# Patient Record
Sex: FEMALE | Race: BLACK OR AFRICAN AMERICAN | Employment: UNEMPLOYED | URBAN - METROPOLITAN AREA
[De-identification: names, ages, dates, MRNs, and addresses within clinical notes are randomized per-mention and may not be internally consistent; named-entity substitution may affect disease eponyms.]

---

## 2017-04-07 ENCOUNTER — HOSPITAL ENCOUNTER (OUTPATIENT)
Dept: MAMMOGRAPHY | Age: 52
Discharge: HOME | End: 2017-04-07
Attending: FAMILY MEDICINE
Payer: MEDICARE

## 2017-04-07 DIAGNOSIS — M89.9 BONE DISORDER: ICD-10-CM

## 2017-04-07 PROCEDURE — 77080 DXA BONE DENSITY AXIAL: CPT

## 2017-05-09 ENCOUNTER — ANESTHESIA EVENT (OUTPATIENT)
Dept: SURGERY | Age: 52
End: 2017-05-09
Payer: MEDICARE

## 2017-05-10 ENCOUNTER — ANESTHESIA (OUTPATIENT)
Dept: SURGERY | Age: 52
End: 2017-05-10
Payer: MEDICARE

## 2017-05-10 ENCOUNTER — HOSPITAL ENCOUNTER (OUTPATIENT)
Age: 52
Setting detail: OUTPATIENT SURGERY
Discharge: HOME | End: 2017-05-10
Attending: ORTHOPAEDIC SURGERY | Admitting: ORTHOPAEDIC SURGERY
Payer: MEDICARE

## 2017-05-10 VITALS
BODY MASS INDEX: 44.14 KG/M2 | SYSTOLIC BLOOD PRESSURE: 163 MMHG | WEIGHT: 257.13 LBS | RESPIRATION RATE: 15 BRPM | DIASTOLIC BLOOD PRESSURE: 111 MMHG | HEART RATE: 87 BPM | OXYGEN SATURATION: 97 % | TEMPERATURE: 98.7 F

## 2017-05-10 PROCEDURE — 74011250636 HC RX REV CODE- 250/636

## 2017-05-10 PROCEDURE — 76210000020 HC REC RM PH II FIRST 0.5 HR: Performed by: ORTHOPAEDIC SURGERY

## 2017-05-10 PROCEDURE — 77030010430: Performed by: ORTHOPAEDIC SURGERY

## 2017-05-10 PROCEDURE — 77030020143 HC AIRWY LARYN INTUB CGAS -A: Performed by: ANESTHESIOLOGY

## 2017-05-10 PROCEDURE — 76942 ECHO GUIDE FOR BIOPSY: CPT | Performed by: ORTHOPAEDIC SURGERY

## 2017-05-10 PROCEDURE — 77030006668 HC BLD SHV MENSCS STRY -B: Performed by: ORTHOPAEDIC SURGERY

## 2017-05-10 PROCEDURE — 74011000250 HC RX REV CODE- 250

## 2017-05-10 PROCEDURE — 77030018673: Performed by: ORTHOPAEDIC SURGERY

## 2017-05-10 PROCEDURE — 76010000160 HC OR TIME 0.5 TO 1 HR INTENSV-TIER 1: Performed by: ORTHOPAEDIC SURGERY

## 2017-05-10 PROCEDURE — C1713 ANCHOR/SCREW BN/BN,TIS/BN: HCPCS | Performed by: ORTHOPAEDIC SURGERY

## 2017-05-10 PROCEDURE — 74011250637 HC RX REV CODE- 250/637: Performed by: ANESTHESIOLOGY

## 2017-05-10 PROCEDURE — 76060000033 HC ANESTHESIA 1 TO 1.5 HR: Performed by: ORTHOPAEDIC SURGERY

## 2017-05-10 PROCEDURE — 77030019605: Performed by: ORTHOPAEDIC SURGERY

## 2017-05-10 PROCEDURE — 77030018836 HC SOL IRR NACL ICUM -A: Performed by: ORTHOPAEDIC SURGERY

## 2017-05-10 PROCEDURE — 74011250636 HC RX REV CODE- 250/636: Performed by: ORTHOPAEDIC SURGERY

## 2017-05-10 PROCEDURE — 76010010054 HC POST OP PAIN BLOCK: Performed by: ORTHOPAEDIC SURGERY

## 2017-05-10 PROCEDURE — 77030002933 HC SUT MCRYL J&J -A: Performed by: ORTHOPAEDIC SURGERY

## 2017-05-10 PROCEDURE — 74011250636 HC RX REV CODE- 250/636: Performed by: ANESTHESIOLOGY

## 2017-05-10 PROCEDURE — 77030033005 HC TBNG ARTHSC PMP STRY -B: Performed by: ORTHOPAEDIC SURGERY

## 2017-05-10 PROCEDURE — 77030027384 HC PRB ARTHSCP SERFAS STRY -C: Performed by: ORTHOPAEDIC SURGERY

## 2017-05-10 PROCEDURE — 76210000063 HC OR PH I REC FIRST 0.5 HR: Performed by: ORTHOPAEDIC SURGERY

## 2017-05-10 PROCEDURE — 77030003666 HC NDL SPINAL BD -A: Performed by: ORTHOPAEDIC SURGERY

## 2017-05-10 PROCEDURE — 77030003602 HC NDL NRV BLK BBMI -B: Performed by: ANESTHESIOLOGY

## 2017-05-10 PROCEDURE — 77030004453 HC BUR SHV STRY -B: Performed by: ORTHOPAEDIC SURGERY

## 2017-05-10 PROCEDURE — 77030033073 HC TBNG ARTHSC PMP OUTFLO STRY -B: Performed by: ORTHOPAEDIC SURGERY

## 2017-05-10 PROCEDURE — 77030032490 HC SLV COMPR SCD KNE COVD -B: Performed by: ORTHOPAEDIC SURGERY

## 2017-05-10 PROCEDURE — 77030034139 HC NDL ENDOSC TRUPASS SGL S&N -C: Performed by: ORTHOPAEDIC SURGERY

## 2017-05-10 DEVICE — MULTIFIX S-ULTRA 5.5MM KNOTLESS ANCHOR
Type: IMPLANTABLE DEVICE | Site: SHOULDER | Status: FUNCTIONAL
Brand: MULTIFIX

## 2017-05-10 RX ORDER — DEXAMETHASONE SODIUM PHOSPHATE 4 MG/ML
INJECTION, SOLUTION INTRA-ARTICULAR; INTRALESIONAL; INTRAMUSCULAR; INTRAVENOUS; SOFT TISSUE AS NEEDED
Status: DISCONTINUED | OUTPATIENT
Start: 2017-05-10 | End: 2017-05-10 | Stop reason: HOSPADM

## 2017-05-10 RX ORDER — PROPOFOL 10 MG/ML
INJECTION, EMULSION INTRAVENOUS AS NEEDED
Status: DISCONTINUED | OUTPATIENT
Start: 2017-05-10 | End: 2017-05-10 | Stop reason: HOSPADM

## 2017-05-10 RX ORDER — OXYCODONE AND ACETAMINOPHEN 5; 325 MG/1; MG/1
1 TABLET ORAL AS NEEDED
Status: DISCONTINUED | OUTPATIENT
Start: 2017-05-10 | End: 2017-05-10 | Stop reason: HOSPADM

## 2017-05-10 RX ORDER — SODIUM CHLORIDE, SODIUM LACTATE, POTASSIUM CHLORIDE, CALCIUM CHLORIDE 600; 310; 30; 20 MG/100ML; MG/100ML; MG/100ML; MG/100ML
75 INJECTION, SOLUTION INTRAVENOUS CONTINUOUS
Status: DISCONTINUED | OUTPATIENT
Start: 2017-05-10 | End: 2017-05-10 | Stop reason: HOSPADM

## 2017-05-10 RX ORDER — HYDROMORPHONE HYDROCHLORIDE 2 MG/ML
0.5 INJECTION, SOLUTION INTRAMUSCULAR; INTRAVENOUS; SUBCUTANEOUS
Status: DISCONTINUED | OUTPATIENT
Start: 2017-05-10 | End: 2017-05-10 | Stop reason: HOSPADM

## 2017-05-10 RX ORDER — LIDOCAINE HYDROCHLORIDE 10 MG/ML
0.1 INJECTION INFILTRATION; PERINEURAL AS NEEDED
Status: DISCONTINUED | OUTPATIENT
Start: 2017-05-10 | End: 2017-05-10 | Stop reason: HOSPADM

## 2017-05-10 RX ORDER — CEFAZOLIN SODIUM IN 0.9 % NACL 2 G/50 ML
2 INTRAVENOUS SOLUTION, PIGGYBACK (ML) INTRAVENOUS ONCE
Status: COMPLETED | OUTPATIENT
Start: 2017-05-10 | End: 2017-05-10

## 2017-05-10 RX ORDER — NALOXONE HYDROCHLORIDE 0.4 MG/ML
0.2 INJECTION, SOLUTION INTRAMUSCULAR; INTRAVENOUS; SUBCUTANEOUS AS NEEDED
Status: DISCONTINUED | OUTPATIENT
Start: 2017-05-10 | End: 2017-05-10 | Stop reason: HOSPADM

## 2017-05-10 RX ORDER — MIDAZOLAM HYDROCHLORIDE 1 MG/ML
2 INJECTION, SOLUTION INTRAMUSCULAR; INTRAVENOUS
Status: DISCONTINUED | OUTPATIENT
Start: 2017-05-10 | End: 2017-05-10 | Stop reason: HOSPADM

## 2017-05-10 RX ORDER — FAMOTIDINE 20 MG/1
20 TABLET, FILM COATED ORAL ONCE
Status: COMPLETED | OUTPATIENT
Start: 2017-05-10 | End: 2017-05-10

## 2017-05-10 RX ORDER — SODIUM CHLORIDE 0.9 % (FLUSH) 0.9 %
5-10 SYRINGE (ML) INJECTION AS NEEDED
Status: DISCONTINUED | OUTPATIENT
Start: 2017-05-10 | End: 2017-05-10 | Stop reason: HOSPADM

## 2017-05-10 RX ORDER — ONDANSETRON 2 MG/ML
INJECTION INTRAMUSCULAR; INTRAVENOUS AS NEEDED
Status: DISCONTINUED | OUTPATIENT
Start: 2017-05-10 | End: 2017-05-10 | Stop reason: HOSPADM

## 2017-05-10 RX ORDER — LIDOCAINE HYDROCHLORIDE 20 MG/ML
INJECTION, SOLUTION EPIDURAL; INFILTRATION; INTRACAUDAL; PERINEURAL AS NEEDED
Status: DISCONTINUED | OUTPATIENT
Start: 2017-05-10 | End: 2017-05-10 | Stop reason: HOSPADM

## 2017-05-10 RX ORDER — EPINEPHRINE 1 MG/ML
INJECTION, SOLUTION, CONCENTRATE INTRAVENOUS AS NEEDED
Status: DISCONTINUED | OUTPATIENT
Start: 2017-05-10 | End: 2017-05-10 | Stop reason: HOSPADM

## 2017-05-10 RX ORDER — FENTANYL CITRATE 50 UG/ML
100 INJECTION, SOLUTION INTRAMUSCULAR; INTRAVENOUS ONCE
Status: COMPLETED | OUTPATIENT
Start: 2017-05-10 | End: 2017-05-10

## 2017-05-10 RX ADMIN — HYDROMORPHONE HYDROCHLORIDE 0.5 MG: 2 INJECTION, SOLUTION INTRAMUSCULAR; INTRAVENOUS; SUBCUTANEOUS at 11:05

## 2017-05-10 RX ADMIN — FENTANYL CITRATE 100 MCG: 50 INJECTION, SOLUTION INTRAMUSCULAR; INTRAVENOUS at 09:27

## 2017-05-10 RX ADMIN — DEXAMETHASONE SODIUM PHOSPHATE 4 MG: 4 INJECTION, SOLUTION INTRA-ARTICULAR; INTRALESIONAL; INTRAMUSCULAR; INTRAVENOUS; SOFT TISSUE at 10:03

## 2017-05-10 RX ADMIN — MIDAZOLAM HYDROCHLORIDE 2 MG: 1 INJECTION, SOLUTION INTRAMUSCULAR; INTRAVENOUS at 09:27

## 2017-05-10 RX ADMIN — FAMOTIDINE 20 MG: 20 TABLET ORAL at 09:15

## 2017-05-10 RX ADMIN — LIDOCAINE HYDROCHLORIDE 60 MG: 20 INJECTION, SOLUTION EPIDURAL; INFILTRATION; INTRACAUDAL; PERINEURAL at 09:54

## 2017-05-10 RX ADMIN — PROPOFOL 200 MG: 10 INJECTION, EMULSION INTRAVENOUS at 09:54

## 2017-05-10 RX ADMIN — SODIUM CHLORIDE, SODIUM LACTATE, POTASSIUM CHLORIDE, AND CALCIUM CHLORIDE 75 ML/HR: 600; 310; 30; 20 INJECTION, SOLUTION INTRAVENOUS at 09:10

## 2017-05-10 RX ADMIN — CEFAZOLIN 2 G: 1 INJECTION, POWDER, FOR SOLUTION INTRAMUSCULAR; INTRAVENOUS; PARENTERAL at 09:51

## 2017-05-10 RX ADMIN — ONDANSETRON 4 MG: 2 INJECTION INTRAMUSCULAR; INTRAVENOUS at 10:03

## 2017-05-10 NOTE — IP AVS SNAPSHOT
Jonathan Mendoza 
 
 
 [facility] 
[phone] Patient: Cathy Lomeli MRN: [id number] Nikki Pain You are allergic to the following Allergen Reactions Aspralum (Aspirin, Buffered) Palpitations Pt reports heart palpatations Recent Documentation Weight BMI OB Status Smoking Status 116.6 kg 44.14 kg/m2 Hysterectomy Never Smoker Emergency Contacts Name Discharge Info Relation Home Work Mobile Renetta Shen CAREGIVER [3] Daughter [21] [phone] About your hospitalization You were admitted on:  May 10, 2017 You last received care in the:  [place] [address] You were discharged on:  May 10, 2017 Unit phone number:  [phone] Why you were hospitalized Your primary diagnosis was:  Not on File Providers Seen During Your Hospitalizations Provider Role Specialty Primary office phone James Al MD Attending Provider Orthopedic Surgery [phone] Your Primary Care Physician (PCP) Primary Care Physician Office Phone Office Fax [address] [facility] [place], [address] [facility] [phone] Follow-up Information None Your Appointments Thursday May 11, 2017 11:30 AM EDT New Patient with Garrett Kiser MD  
[facility] ENDOCRINOLOGY) [address] [place] [address] [address] [phone] Current Discharge Medication List  
  
ASK your doctor about these medications Dose & Instructions Dispensing Information Comments Morning Noon Evening Bedtime buPROPion  mg SR tablet Commonly known as:  Jv Holding Your last dose was: Your next dose is:    
   
   
 Dose:  100 mg Take 100 mg by mouth nightly. Refills:  0  
     
   
   
   
  
 lisinopril 40 mg tablet Commonly known as:  Michael Leys Your last dose was: Your next dose is:    
   
   
 Dose:  40 mg Take 40 mg by mouth nightly. Refills:  0  
     
   
   
   
  
 omeprazole 40 mg capsule Commonly known as:  PRILOSEC Your last dose was: Your next dose is:    
   
   
 Dose:  40 mg Take 40 mg by mouth daily. Refills:  0  
     
   
   
   
  
 pravastatin 20 mg tablet Commonly known as:  PRAVACHOL Your last dose was: Your next dose is:    
   
   
 Dose:  20 mg Take 20 mg by mouth nightly. Refills:  0 Discharge Instructions Post-Operative Instructions For Shoulder Arthroscopy Rotator Cuff Repair Phone:  [phone] 1. If you do not have an \"Iceman\" type cooling unit, for the first 48-72 hours following surgery, use ice on the shoulder every two hours (while awake) for 20-30 minutes at a time to help prevent swelling and lessen pain. If you have a cooling unit, follow the instructions given to you- continually as much as possible the first 48-72 hours, then 3-4 times a day for 4 weeks. 2. You may shower after the arthroscopy. Keep dressings in place for the first three days. After three days, you may remove the dressings and leave the steri-strip bandages in place; they will peel off naturally. 3. Stay in sling at all times except to shower. 4. Begin therapy as ordered. 5. Use any pain medication as instructed. You should take your pain medication as soon as you feel the anesthetic wearing off. Do not wait until you are in severe pain to begin taking your pain medication. 6. You may have some side effects from your pain medication. If you have nausea, try taking your medication with food. For itching, you may take over the counter Benadryl. 7. You may have been given a prescription for Zofran or Phenergan.   This medication is used for nausea and vomiting. You do not need to get this prescription filled unless you have a problem. 8. If you have a problem, please call [address] [place] [address] at [phone] [employer] ACTIVITY · As tolerated and as directed by your doctor. · Bathe or shower as directed by your doctor. DIET · Clear liquids until no nausea or vomiting; then light diet for the first day. · Advance to regular diet on second day, unless your doctor orders otherwise. · If nausea and vomiting continues, call your doctor. PAIN 
· Take pain medication as directed by your doctor. · Call your doctor if pain is NOT relieved by medication. · DO NOT take aspirin of blood thinners unless directed by your doctor. DRESSING CARE  
 
 
CALL YOUR DOCTOR IF  
· Excessive bleeding that does not stop after holding pressure over the area · Temperature of 101 degrees F or above · Excessive redness, swelling or bruising, and/ or green or yellow, smelly discharge from incision AFTER ANESTHESIA · For the first 24 hours: DO NOT Drive, Drink alcoholic beverages, or Make important decisions. · Be aware of dizziness following anesthesia and while taking pain medication. APPOINTMENT DATE/ TIME 
 
YOUR DOCTOR'S PHONE NUMBER  
 
 
DISCHARGE SUMMARY from Nurse PATIENT INSTRUCTIONS: 
 
After general anesthesia or intravenous sedation, for 24 hours or while taking prescription Narcotics: · Limit your activities · Do not drive and operate hazardous machinery · Do not make important personal or business decisions · Do  not drink alcoholic beverages · If you have not urinated within 8 hours after discharge, please contact your surgeon on call. *  Please give a list of your current medications to your Primary Care Provider.  
 
*  Please update this list whenever your medications are discontinued, doses are 
 changed, or new medications (including over-the-counter products) are added. *  Please carry medication information at all times in case of emergency situations. These are general instructions for a healthy lifestyle: No smoking/ No tobacco products/ Avoid exposure to second hand smoke Surgeon General's Warning:  Quitting smoking now greatly reduces serious risk to your health. Obesity, smoking, and sedentary lifestyle greatly increases your risk for illness A healthy diet, regular physical exercise & weight monitoring are important for maintaining a healthy lifestyle You may be retaining fluid if you have a history of heart failure or if you experience any of the following symptoms:  Weight gain of 3 pounds or more overnight or 5 pounds in a week, increased swelling in our hands or feet or shortness of breath while lying flat in bed. Please call your doctor as soon as you notice any of these symptoms; do not wait until your next office visit. Recognize signs and symptoms of STROKE: 
 
F-face looks uneven A-arms unable to move or move unevenly S-speech slurred or non-existent T-time-call 911 as soon as signs and symptoms begin-DO NOT go Back to bed or wait to see if you get better-TIME IS BRAIN. Discharge Orders None Introducing [facility] HE[facility]! Mario Mckeon introduces Sellywhere patient portal. Now you can access parts of your medical record, email your doctor's office, and request medication refills online. 1. In your internet browser, go to [email or web address] 2. Click on the First Time User? Click Here link in the Sign In box. You will see the New Member Sign Up page. 3. Enter your Sellywhere Access Code exactly as it appears below. You will not need to use this code after youve completed the sign-up process. If you do not sign up before the expiration date, you must request a new code.  
 
· Sellywhere Access Code: [email or web address] 
 Expires: 6/13/2017  4:41 PM 
 
4. Enter the last four digits of your Social Security Number (xxxx) and Date of Birth (mm/dd/yyyy) as indicated and click Submit. You will be taken to the next sign-up page. 5. Create a Spoonfed ID. This will be your Spoonfed login ID and cannot be changed, so think of one that is secure and easy to remember. 6. Create a Spoonfed password. You can change your password at any time. 7. Enter your Password Reset Question and Answer. This can be used at a later time if you forget your password. 8. Enter your e-mail address. You will receive e-mail notification when new information is available in [address] 9. Click Sign Up. You can now view and download portions of your medical record. 10. Click the Download Summary menu link to download a portable copy of your medical information. If you have questions, please visit the Frequently Asked Questions section of the Spoonfed website. Remember, Spoonfed is NOT to be used for urgent needs. For medical emergencies, dial 911. Now available from your iPhone and Android! General Information Please provide this summary of care documentation to your next provider. Patient Signature:  ____________________________________________________________ Date:  ____________________________________________________________  
  
Alen Flores Provider Signature:  ____________________________________________________________ Date:  ____________________________________________________________

## 2017-05-10 NOTE — ANESTHESIA PREPROCEDURE EVALUATION
Anesthetic History   No history of anesthetic complications            Review of Systems / Medical History  Patient summary reviewed, nursing notes reviewed and pertinent labs reviewed    Pulmonary        Sleep apnea: No treatment           Neuro/Psych              Cardiovascular    Hypertension: well controlled          Hyperlipidemia    Exercise tolerance: >4 METS     GI/Hepatic/Renal     GERD: well controlled           Endo/Other  Within defined limits           Other Findings              Physical Exam    Airway  Mallampati: III  TM Distance: 4 - 6 cm  Neck ROM: normal range of motion   Mouth opening: Normal     Cardiovascular    Rhythm: regular           Dental  No notable dental hx       Pulmonary  Breath sounds clear to auscultation               Abdominal         Other Findings            Anesthetic Plan    ASA: 2  Anesthesia type: general      Post-op pain plan if not by surgeon: peripheral nerve block single    Induction: Intravenous  Anesthetic plan and risks discussed with: Patient

## 2017-05-10 NOTE — ANESTHESIA POSTPROCEDURE EVALUATION
Post-Anesthesia Evaluation and Assessment    Patient: Sofie Ricks MRN: [id number]  SSN: [id number]    YOB: 1965  Age: 49 y.o. Sex: female       Cardiovascular Function/Vital Signs  Visit Vitals    BP (!) 163/111    Pulse 87    Temp 37.1 °C (98.7 °F)    Resp 15    Wt 116.6 kg (257 lb 2 oz)    SpO2 97%    BMI 44.14 kg/m2       Patient is status post general anesthesia for Procedure(s):  SHOULDER ARTHROSCOPY ROTATOR CUFF REPAIR/ LEFT DECOMPRESSION, DISTAL CLAVICAL EXCISION. Nausea/Vomiting: None    Postoperative hydration reviewed and adequate. Pain:  Pain Scale 1: Numeric (0 - 10) (05/10/17 1112)  Pain Intensity 1: 0 (05/10/17 1112)   Managed    Neurological Status:   Neuro (WDL): Within Defined Limits (05/10/17 1112)  Neuro  Neurologic State: Alert (05/10/17 1112)  LUE Motor Response: Numbness; Pharmacologically paralyzed (05/10/17 1112)  LLE Motor Response: Purposeful (05/10/17 1112)  RUE Motor Response: Purposeful (05/10/17 1112)  RLE Motor Response: Purposeful (05/10/17 1112)   At baseline    Mental Status and Level of Consciousness: Arousable    Pulmonary Status:   O2 Device: Room air (05/10/17 1121)   Adequate oxygenation and airway patent    Complications related to anesthesia: None    Post-anesthesia assessment completed.  No concerns    Signed By: Ethel Suggs MD     May 10, 2017

## 2017-05-10 NOTE — ANESTHESIA PROCEDURE NOTES
Peripheral Block    Start time: 5/10/2017 9:27 AM  End time: 5/10/2017 9:31 AM  Performed by: Seth Pedroza  Authorized by: Seth Pedroza       Pre-procedure: Indications: at surgeon's request and post-op pain management    Preanesthetic Checklist: patient identified, risks and benefits discussed, site marked, timeout performed, anesthesia consent given and patient being monitored    Timeout Time: 09:27          Block Type:   Block Type:   Interscalene  Laterality:  Left  Monitoring:  Standard ASA monitoring, responsive to questions, oxygen, continuous pulse ox, frequent vital sign checks and heart rate  Injection Technique:  Single shot  Procedures: ultrasound guided    Patient Position: seated  Prep: chlorhexidine    Location:  Interscalene  Needle Type:  Stimuplex  Needle Gauge:  22 G  Needle Localization:  Ultrasound guidance  Medication Injected:  0.375%  bupivacaine  Adds:  Epi 1:400K  Volume (mL):  25  mepivacaine  Volume (mL):  10    Assessment:  Number of attempts:  1  Injection Assessment:  Incremental injection every 5 mL, negative aspiration for CSF, no paresthesia, ultrasound image on chart, no intravascular symptoms, negative aspiration for blood and local visualized surrounding nerve on ultrasound  Patient tolerance:  Patient tolerated the procedure well with no immediate complications

## 2017-05-10 NOTE — DISCHARGE INSTRUCTIONS
Post-Operative Instructions   For  Shoulder Arthroscopy Rotator Cuff Repair  Phone:  [phone]    1. If you do not have an \"Iceman\" type cooling unit, for the first 48-72 hours following surgery, use ice on the shoulder every two hours (while awake) for 20-30 minutes at a time to help prevent swelling and lessen pain. If you have a cooling unit, follow the instructions given to you- continually as much as possible the first 48-72 hours, then 3-4 times a day for 4 weeks. 2. You may shower after the arthroscopy. Keep dressings in place for the first three days. After three days, you may remove the dressings and leave the steri-strip bandages in place; they will peel off naturally. 3. Stay in sling at all times except to shower. 4. Begin therapy as ordered. 5. Use any pain medication as instructed. You should take your pain medication as soon as you feel the anesthetic wearing off. Do not wait until you are in severe pain to begin taking your pain medication. 6. You may have some side effects from your pain medication. If you have nausea, try taking your medication with food. For itching, you may take over the counter Benadryl. 7. You may have been given a prescription for Zofran or Phenergan. This medication is used for nausea and vomiting. You do not need to get this prescription filled unless you have a problem. 8. If you have a problem, please call [address] [place] [address] at [phone]    [address] [place] [address], P.A[facility] ACTIVITY  · As tolerated and as directed by your doctor. · Bathe or shower as directed by your doctor. DIET  · Clear liquids until no nausea or vomiting; then light diet for the first day. · Advance to regular diet on second day, unless your doctor orders otherwise. · If nausea and vomiting continues, call your doctor. PAIN  · Take pain medication as directed by your doctor.    · Call your doctor if pain is NOT relieved by medication. · DO NOT take aspirin of blood thinners unless directed by your doctor. DRESSING CARE       CALL YOUR DOCTOR IF   · Excessive bleeding that does not stop after holding pressure over the area  · Temperature of 101 degrees F or above  · Excessive redness, swelling or bruising, and/ or green or yellow, smelly discharge from incision    AFTER ANESTHESIA   · For the first 24 hours: DO NOT Drive, Drink alcoholic beverages, or Make important decisions. · Be aware of dizziness following anesthesia and while taking pain medication. APPOINTMENT DATE/ TIME    YOUR DOCTOR'S PHONE NUMBER       DISCHARGE SUMMARY from Nurse    PATIENT INSTRUCTIONS:    After general anesthesia or intravenous sedation, for 24 hours or while taking prescription Narcotics:  · Limit your activities  · Do not drive and operate hazardous machinery  · Do not make important personal or business decisions  · Do  not drink alcoholic beverages  · If you have not urinated within 8 hours after discharge, please contact your surgeon on call. *  Please give a list of your current medications to your Primary Care Provider. *  Please update this list whenever your medications are discontinued, doses are      changed, or new medications (including over-the-counter products) are added. *  Please carry medication information at all times in case of emergency situations. These are general instructions for a healthy lifestyle:    No smoking/ No tobacco products/ Avoid exposure to second hand smoke    Surgeon General's Warning:  Quitting smoking now greatly reduces serious risk to your health.     Obesity, smoking, and sedentary lifestyle greatly increases your risk for illness    A healthy diet, regular physical exercise & weight monitoring are important for maintaining a healthy lifestyle    You may be retaining fluid if you have a history of heart failure or if you experience any of the following symptoms:  Weight gain of 3 pounds or more overnight or 5 pounds in a week, increased swelling in our hands or feet or shortness of breath while lying flat in bed. Please call your doctor as soon as you notice any of these symptoms; do not wait until your next office visit. Recognize signs and symptoms of STROKE:    F-face looks uneven    A-arms unable to move or move unevenly    S-speech slurred or non-existent    T-time-call 911 as soon as signs and symptoms begin-DO NOT go       Back to bed or wait to see if you get better-TIME IS BRAIN.

## 2017-05-10 NOTE — H&P
Outpatient Surgery History and Physical:  Robinson Levin was seen and examined. CHIEF COMPLAINT:   Left shoulder pain. PE:     Visit Vitals    BP (!) 140/93 (BP 1 Location: Right arm, BP Patient Position: Supine)    Pulse 85    Temp 98.7 °F (37.1 °C)    Resp 18    Wt 116.6 kg (257 lb 2 oz)    SpO2 99%    BMI 44.14 kg/m2       Heart:   Regular rhythm      Lungs:  Are clear      Past Medical History: There are no active problems to display for this patient. Surgical History:   Past Surgical History:   Procedure Laterality Date    HX GYN      HX HYSTERECTOMY      HX TUMOR REMOVAL      Brain        Social History: Patient  reports that she has never smoked. She does not have any smokeless tobacco history on file. She reports that she does not drink alcohol. Family History:   Family History   Problem Relation Age of Onset    Liver Disease Mother     Cancer Father        Allergies: Reviewed per EMR  Allergies   Allergen Reactions    Aspralum [Aspirin, Buffered] Palpitations     Pt reports heart palpatations       Medications:    No current facility-administered medications on file prior to encounter. Current Outpatient Prescriptions on File Prior to Encounter   Medication Sig    lisinopril (PRINIVIL, ZESTRIL) 40 mg tablet Take 40 mg by mouth nightly.  omeprazole (PRILOSEC) 40 mg capsule Take 40 mg by mouth daily.  pravastatin (PRAVACHOL) 20 mg tablet Take 20 mg by mouth nightly.  buPROPion SR (WELLBUTRIN SR) 100 mg SR tablet Take 100 mg by mouth nightly. The surgery is planned for the left shoulder. History and physical has been reviewed. The patient has been examined. There have been no significant clinical changes since the completion of the originally dated History and Physical.  Patient identified by surgeon; surgical site was confirmed by patient and surgeon. The patient is here today for outpatient surgery.  I have examined the patient, no changes are noted in the patient's medical status. Necessity for the procedure/care is still present and the history and physical above is current. See the office notes for the full long term history of the problem. Please see the recent office notes for the musculoskeletal examination.     Signed By: ETELVINA Calixto     May 10, 2017 8:44 AM

## 2017-05-11 NOTE — OP NOTES
[facility]   OPERATIVE REPORT       Name:  Teresita Niño   MR#:  [id number]   :  1965   Account #:  [de-identified]   Date of Adm:  05/10/2017       DATE OF SURGERY: 05/10/2017     PREPROCEDURE DIAGNOSES   1. Rotator cuff tear. 2. Acromioclavicular arthritis. 3. Impingement. 4. Labral tear, left shoulder. POSTOPERATIVE DIAGNOSES   1. Rotator cuff tear. 2. Acromioclavicular arthritis. 3. Impingement. 4. Labral tear, left shoulder. OPERATION PERFORMED     1. Arthroscopic rotator cuff repair. 2. Arthroscopic distal clavicle excision (Christiano procedure). 3. Debridement of labral tear. 4. Arthroscopic subacromial decompression, left shoulder. SURGEON: Teja Mayers MD.    ASSISTANT: ETELVINA Michel.     ANESTHESIA: General.    FLUIDS: Crystalloid. ESTIMATED BLOOD LOSS: Minimal.    FINDINGS: There was a high-grade partial tear encompassing   approximately 90% of the thickness of the supraspinatus. The   biceps was intact. There was some anterior and superior labral   tearing. There was no glenohumeral arthritis. There was an   anterior/inferior acromial slope. There was undersurface   osteophyte formation and degenerative change of the distal   clavicle at the [facility] joint. DESCRIPTION OF PROCEDURE: After informed consent, the patient   was brought to the operating room, placed on the table in supine   position. General endotracheal anesthesia was administered   without difficulty. The patient was placed in lateral decubitus   position. All pressure points were inspected and padded   appropriately. The left upper extremity was suspended in   overhead traction. Left shoulder was prepped and draped in   sterile fashion. Glenohumeral joint was insufflated with 50 mL   of sterile saline and a posterior portal was established. Glenohumeral joint was then arthroscoped in sequential manner. The aforementioned findings were noted.  An anterior portal was   established. Labral tearing was debrided smooth. All loose flaps   of tissue were removed. There were no sharp edges or unstable   fragments after labral debridement. Undersurface rotator cuff   tearing was debrided back to a smooth, stable area. The high-  grade tear was as noted. The scope was brought in the   subacromial space. A lateral portal was established. Bursal   proliferative tissue was excised. The undersurface of the   acromion was exposed and acromioplasty was performed. All the   acromion anterior to the leading edge of distal clavicle was   excised a depth of 5 to 6 mm and 2 cm anterior to posterior   direction was removed. This opened up the subacromial space   nicely. Attention was then directed to the distal clavicle. Through both the anterior and lateral portals, a circumferential   distal clavicle excision was performed. Approximately 10 mm of   distal clavicle was excised. Circumferential excision was   verified arthroscopically. The Christiano procedure was performed   without difficulty. Attention was then directed to the rotator   cuff. The high-grade partial tear was converted to a full-  thickness tear. The area underneath the original insertion was   lightly decorticated. A Multifix anchor and 2 Ultratape sutures   in a mattress fashion were used to completely repair the tear. Anatomic rotator cuff repair was performed. The rotator cuff   tear was repaired without difficulty. The shoulder was   thoroughly irrigated, portals closed. Sterile dressings were   applied. The patient was extubated and taken to recovery room in   stable condition. ETELVINA Boothe, assisted during the procedure. He was necessary   for patient positioning, wound closure and assistance with the   major portions of the operation including the rotator cuff   repair. His presence decreased the operative time and potential   complication rate.         MD MELVA Su / Francisco Andrade   D:  05/11/2017 07:58   T:  05/11/2017   08:12   Job #:  [id number]

## 2017-08-03 ENCOUNTER — HOSPITAL ENCOUNTER (OUTPATIENT)
Dept: LAB | Age: 52
Discharge: HOME | End: 2017-08-03
Attending: INTERNAL MEDICINE
Payer: MEDICARE

## 2017-08-03 DIAGNOSIS — D35.2 PITUITARY ADENOMA (HCC): ICD-10-CM

## 2017-08-03 DIAGNOSIS — M85.89 OSTEOPENIA OF MULTIPLE SITES: ICD-10-CM

## 2017-08-03 DIAGNOSIS — E55.9 VITAMIN D DEFICIENCY: ICD-10-CM

## 2017-08-03 DIAGNOSIS — Z78.0 POSTMENOPAUSAL: ICD-10-CM

## 2017-08-03 LAB
ANION GAP BLD CALC-SCNC: 8 MMOL/L
BUN SERPL-MCNC: 5 MG/DL (ref 6–23)
CALCIUM SERPL-MCNC: 9.3 MG/DL (ref 8.3–10.4)
CHLORIDE SERPL-SCNC: 102 MMOL/L (ref 98–107)
CO2 SERPL-SCNC: 31 MMOL/L (ref 21–32)
CREAT SERPL-MCNC: 0.8 MG/DL (ref 0.6–1)
GLUCOSE SERPL-MCNC: 88 MG/DL (ref 65–100)
POTASSIUM SERPL-SCNC: 3.9 MMOL/L (ref 3.5–5.1)
SODIUM SERPL-SCNC: 141 MMOL/L (ref 136–145)
T4 FREE SERPL-MCNC: 1.2 NG/DL (ref 0.78–1.46)
TSH SERPL DL<=0.005 MIU/L-ACNC: 1.19 UIU/ML (ref 0.36–3.74)

## 2017-08-03 PROCEDURE — 80048 BASIC METABOLIC PNL TOTAL CA: CPT | Performed by: INTERNAL MEDICINE

## 2017-08-03 PROCEDURE — 84443 ASSAY THYROID STIM HORMONE: CPT | Performed by: INTERNAL MEDICINE

## 2017-08-03 PROCEDURE — 36415 COLL VENOUS BLD VENIPUNCTURE: CPT | Performed by: INTERNAL MEDICINE

## 2017-08-03 PROCEDURE — 83001 ASSAY OF GONADOTROPIN (FSH): CPT | Performed by: INTERNAL MEDICINE

## 2017-08-03 PROCEDURE — 84439 ASSAY OF FREE THYROXINE: CPT | Performed by: INTERNAL MEDICINE

## 2017-08-03 PROCEDURE — 82306 VITAMIN D 25 HYDROXY: CPT | Performed by: INTERNAL MEDICINE

## 2017-08-04 LAB — 25(OH)D3+25(OH)D2 SERPL-MCNC: 44.2 NG/ML (ref 30–100)

## 2017-08-08 LAB — FSH SERPL-ACNC: 33.3 MIU/ML

## 2017-09-05 ENCOUNTER — HOSPITAL ENCOUNTER (OUTPATIENT)
Dept: MRI IMAGING | Age: 52
Discharge: HOME | End: 2017-09-05
Attending: INTERNAL MEDICINE
Payer: MEDICARE

## 2017-09-05 DIAGNOSIS — D35.2 PITUITARY ADENOMA (HCC): ICD-10-CM

## 2017-09-05 PROCEDURE — A9577 INJ MULTIHANCE: HCPCS | Performed by: INTERNAL MEDICINE

## 2017-09-05 PROCEDURE — 74011250636 HC RX REV CODE- 250/636: Performed by: INTERNAL MEDICINE

## 2017-09-05 PROCEDURE — 70553 MRI BRAIN STEM W/O & W/DYE: CPT

## 2017-09-05 PROCEDURE — 74011000258 HC RX REV CODE- 258: Performed by: INTERNAL MEDICINE

## 2017-09-05 RX ORDER — SODIUM CHLORIDE 0.9 % (FLUSH) 0.9 %
10 SYRINGE (ML) INJECTION
Status: COMPLETED | OUTPATIENT
Start: 2017-09-05 | End: 2017-09-05

## 2017-09-05 RX ADMIN — GADOBENATE DIMEGLUMINE 20 ML: 529 INJECTION, SOLUTION INTRAVENOUS at 14:04

## 2017-09-05 RX ADMIN — SODIUM CHLORIDE 100 ML: 900 INJECTION, SOLUTION INTRAVENOUS at 14:04

## 2017-09-05 RX ADMIN — Medication 10 ML: at 14:04

## 2019-08-22 ENCOUNTER — TELEPHONE (OUTPATIENT)
Dept: NUTRITION | Age: 54
End: 2019-08-22

## 2019-08-22 NOTE — TELEPHONE ENCOUNTER
Nutrition Counseling: Contacted pt regarding referral. See notes documented in Nutrition Counseling Referral for details. No further follow-up contact from pt. Will close referral for this office and notify referring provider.     [facility]  [phone]

## 2022-03-18 PROBLEM — M85.89 OSTEOPENIA OF MULTIPLE SITES: Status: ACTIVE | Noted: 2017-08-03

## 2022-03-19 PROBLEM — Z78.0 POSTMENOPAUSAL: Status: ACTIVE | Noted: 2017-08-03

## 2022-04-29 LAB
AVERAGE GLUCOSE: 128
HBA1C MFR BLD: 6.1 %

## 2022-12-02 ENCOUNTER — OFFICE VISIT (OUTPATIENT)
Dept: ORTHOPEDIC SURGERY | Age: 57
End: 2022-12-02

## 2022-12-02 VITALS — HEIGHT: 65 IN | WEIGHT: 260 LBS | BODY MASS INDEX: 43.32 KG/M2

## 2022-12-02 DIAGNOSIS — M25.512 LEFT SHOULDER PAIN, UNSPECIFIED CHRONICITY: Primary | ICD-10-CM

## 2022-12-02 RX ORDER — DILTIAZEM HYDROCHLORIDE 60 MG/1
TABLET, FILM COATED ORAL
COMMUNITY
Start: 2022-10-24

## 2022-12-02 RX ORDER — PRAVASTATIN SODIUM 40 MG
TABLET ORAL
COMMUNITY
Start: 2022-10-24

## 2022-12-02 RX ORDER — CHLORTHALIDONE 25 MG/1
TABLET ORAL
COMMUNITY
Start: 2022-10-24

## 2022-12-02 RX ORDER — FLUTICASONE PROPIONATE 50 MCG
SPRAY, SUSPENSION (ML) NASAL
COMMUNITY
Start: 2017-05-04

## 2022-12-02 RX ORDER — OMEPRAZOLE 20 MG/1
CAPSULE, DELAYED RELEASE ORAL
COMMUNITY
Start: 2022-10-24

## 2022-12-02 RX ORDER — FEXOFENADINE HCL 180 MG/1
TABLET ORAL
COMMUNITY
Start: 2015-06-08

## 2022-12-02 RX ORDER — METHYLPREDNISOLONE 4 MG/1
TABLET ORAL
Qty: 1 KIT | Refills: 0 | Status: SHIPPED | OUTPATIENT
Start: 2022-12-02

## 2022-12-02 NOTE — PROGRESS NOTES
Name: Hillary Tamez  YOB: 1965  Gender: female  MRN: [id number]      CC: Shoulder Pain (Left)       HPI: Hillary Tamez is a 65 y.o. female who presents with Shoulder Pain (Left)  Miss Aquiles Balderrama is a new patient who presents with left shoulder pain. She denies any mechanism of injury but reports that her pain has progressively worsened over the past 3 years. She does report having her left rotator cuff repaired back in 2017 which she recovered fully from. She complains today that her pain has become more persistent and is hindering her from ADLs. She notes difficulty with getting dressed and doing her hair, as well as lifting due to increased weakness. She denies seeking any recent formal treatment prior since rehab at the time of her surgery. ROS/Meds/PSH/PMH/FH/SH: I personally reviewed the patients standard intake form. Below are the pertinents    Tobacco:  reports that she has never smoked. She has never used smokeless tobacco.  Diabetes: none  Other: HTN, GERD, pituitary adenoma    Physical Examination:  General: no acute distress  Lungs: breathing easily  CV: regular rhythm by pulse  Left Shoulder: No obvious deformity of the biceps. Active forward flexion to 130 degrees limited by pain. External rotation with elbows at side equal bilaterally. External rotation with elbows at side resisted range of motion 4+/5 strength. Positive empty can test with 4 -/5 strength in the empty can position. Positive Johnanna Plane, Neer's. Pain with [place]ren's and speeds. Imaging:   Shoulder XR: Grashey, Axillary and Scapula Y views     Clinical Indication:  1.  Left shoulder pain, unspecified chronicity           Report: Grashey, Axillary and Scapula Y XRs of the Left shoulder demonstrates mild to moderate degenerative changes of the glenohumeral joint    Impression: Mild to moderate DJD        All imaging interpreted by myself TRITSIAN Stewart independent of radiology review        Assessment:   1. Left shoulder pain, unspecified chronicity         Plan: Think the majority of the patient's left shoulder pain is due to a rotator cuff pathology. I discussed with the patient conservative treatment options to include corticosteroid injection, formal physical therapy and advanced imaging. I think based on the amount of weakness and lack of range of motion the patient is experiencing is reasonable to evaluate this further with an MRI. She will return after MRI for definitive treatment. In the meantime for her acute shoulder pain I will provide her with a Medrol Dosepak to help with her pain and inflammation.         Ely Noonan, MS, APRN, FNP-BC  Orthopaedics and Sports Medicine

## 2022-12-08 ENCOUNTER — CLINICAL DOCUMENTATION (OUTPATIENT)
Dept: ORTHOPEDIC SURGERY | Age: 57
End: 2022-12-08

## 2022-12-08 NOTE — PROGRESS NOTES
I AM DOCUMENTING FOR SYED APONTE. SHE CALLED PATIENT AND CANCELED MRI APPOINTMENT FOR TOMORROW AS IT IS PENDING WITH [facility]. WILL CALL AND RESCHEDULE ONCE WE RECEIVE AN APPROVAL.  PATIENT WAS UNDERSTANDING

## 2022-12-13 ENCOUNTER — TELEPHONE (OUTPATIENT)
Dept: ORTHOPEDIC SURGERY | Age: 57
End: 2022-12-13

## 2022-12-13 ENCOUNTER — CLINICAL DOCUMENTATION (OUTPATIENT)
Dept: ORTHOPEDIC SURGERY | Age: 57
End: 2022-12-13

## 2022-12-13 NOTE — PROGRESS NOTES
Kristy attempted to call patient and reschedule mri appointment. No answer left voicemail. Pt is over 260lbs but had an appointment at [place] by mistake from .       [id number] approved  B#[id number]  12/9/22-1/8/23

## 2022-12-14 NOTE — TELEPHONE ENCOUNTER
Returned patient's call and rescheduled her MRI for 7:45am tomorrow morning at our [facility]. I also informed her to arrive 15 mins early.

## 2022-12-16 ENCOUNTER — OFFICE VISIT (OUTPATIENT)
Dept: ORTHOPEDIC SURGERY | Age: 57
End: 2022-12-16

## 2022-12-16 DIAGNOSIS — M19.012 OSTEOARTHRITIS OF GLENOHUMERAL JOINT, LEFT: ICD-10-CM

## 2022-12-16 DIAGNOSIS — M25.512 LEFT SHOULDER PAIN, UNSPECIFIED CHRONICITY: Primary | ICD-10-CM

## 2022-12-16 RX ORDER — TRIAMCINOLONE ACETONIDE 40 MG/ML
40 INJECTION, SUSPENSION INTRA-ARTICULAR; INTRAMUSCULAR ONCE
Status: COMPLETED | OUTPATIENT
Start: 2022-12-16 | End: 2022-12-16

## 2022-12-16 RX ADMIN — TRIAMCINOLONE ACETONIDE 40 MG: 40 INJECTION, SUSPENSION INTRA-ARTICULAR; INTRAMUSCULAR at 13:45

## 2022-12-16 NOTE — PROGRESS NOTES
Name: Lucas Jean  YOB: 1965  Gender: female  MRN: [id number]      CC: Follow-up (L shoulder MRI f/u)       HPI: Lucas Jean is a 65 y.o. female who returns for follow up with MRI results on left shoulder pain. Reports no relief with the Medrol Dosepak that was previously provided and continues to have shoulder pain and weakness. Physical Examination:  General: no acute distress  Lungs: breathing easily  CV: regular rhythm by pulse  Left Shoulder: No obvious deformity of the biceps. Tenderness to palpation anterior shoulder and bicipital groove. Active elevation to 150 degrees limited by pain. External rotation with elbows at side equal bilaterally. External rotation with elbows at side resisted range of motion 5/5 strength. Pain with empty can testing with 4/5 strength empty can position. Positive Merissa Brittny Neer's. Pain with Wood's and speeds. Imaging I reviewed an MRI from 12/15/2022 which shows evidence of prior rotator cuff repair without evidence of recurrent tear. Evidence of interval Scar procedure and subacromial decompression. Osteoarthritic change of the glenohumeral joint with irregular the glenohumeral cartilage, significant edema-like marrow signal within the humeral head and glenoid and subchondral cystic change. Large glenohumeral joint effusion. Assessment:   1. Left shoulder pain, unspecified chronicity    2. Osteoarthritis of glenohumeral joint, left         Plan:   Discussed with the patient that I think the majority of her weakness is due to pain within the shoulder joint caused by osteoarthritis. I discussed with the patient that the only definitive surgical treatment for this would be a shoulder total replacement and she does not wish to proceed with that at this time.   I discussed with the patient conservative treatment options to include corticosteroid injection and formal physical therapy which we will order today. I think if we can get her pain under control with a corticosteroid injection then her strength would improve. If she does not get improvement from corticosteroid injection and formal physical therapy and the neck step would be referral to an orthopedic surgeon. After informed verbal consent was obtained the left shoulder glenohumeral joint was injected from a direct anterior approach with 6 cc of 0.25% Marcaine and 1 cc of 40 mg Kenalog. The patient tolerated the injection well and was given postinjection flare precautions.     Tejas Tlobert, APRN - CNP    Orthopaedics and Sports Medicine

## 2023-01-18 ENCOUNTER — OFFICE VISIT (OUTPATIENT)
Age: 58
End: 2023-01-18
Payer: MEDICARE

## 2023-01-18 DIAGNOSIS — Z78.9 IMPAIRED MOTOR CONTROL: ICD-10-CM

## 2023-01-18 DIAGNOSIS — R29.898 SHOULDER WEAKNESS: ICD-10-CM

## 2023-01-18 DIAGNOSIS — Z78.9 IMPAIRED MOBILITY AND ADLS: ICD-10-CM

## 2023-01-18 DIAGNOSIS — Z74.09 IMPAIRED MOBILITY AND ADLS: ICD-10-CM

## 2023-01-18 DIAGNOSIS — M25.612 DECREASED RANGE OF MOTION OF LEFT SHOULDER: ICD-10-CM

## 2023-01-18 DIAGNOSIS — G89.29 CHRONIC LEFT SHOULDER PAIN: Primary | ICD-10-CM

## 2023-01-18 DIAGNOSIS — Z74.09 DECREASED FUNCTIONAL MOBILITY AND ENDURANCE: ICD-10-CM

## 2023-01-18 DIAGNOSIS — M19.012 OSTEOARTHRITIS OF GLENOHUMERAL JOINT, LEFT: ICD-10-CM

## 2023-01-18 DIAGNOSIS — M25.512 CHRONIC LEFT SHOULDER PAIN: Primary | ICD-10-CM

## 2023-01-18 PROCEDURE — 97161 PT EVAL LOW COMPLEX 20 MIN: CPT

## 2023-01-18 PROCEDURE — 97110 THERAPEUTIC EXERCISES: CPT

## 2023-01-18 PROCEDURE — 97140 MANUAL THERAPY 1/> REGIONS: CPT

## 2023-01-18 NOTE — PROGRESS NOTES
[facility] [facility]  [address] [address]  Dept: [phone]      Physical Therapy Initial Assessment     Referring MD: NICK Cleary - *  Diagnosis:     ICD-10-CM    1. Chronic left shoulder pain  M25.512     G89.29       2. Osteoarthritis of glenohumeral joint, left  M19.012       3. Impaired mobility and ADLs  Z74.09     Z78.9       4. Decreased functional mobility and endurance  Z74.09       5. Impaired motor control  Z78.9       6. Decreased range of motion of left shoulder  M25.612       7. Shoulder weakness  R29.898          Therapy precautions:None  Co-morbidities affecting plan of care: hx of pituitary adenoma, RTC repair (L, 2017)  Total Timed Procedure Codes: 25 min, Total Time: 45 min    PERTINENT MEDICAL HISTORY     Past medical and surgical history:   Past Medical History:   Diagnosis Date    Gastroesophageal reflux disease     Hiatal hernia     Hypertension     Irritable bowel syndrome     Obstructive sleep apnea     Osteopenia     Pituitary adenoma ([facility] [address]     Vitamin D deficiency       Past Surgical History:   Procedure Laterality Date    BREAST BIOPSY Right 2012    benign    HYSTERECTOMY (CERVIX STATUS UNKNOWN)  1997    ovaries intact    LAPAROSCOPY      ROTATOR CUFF REPAIR Left 2017    Ethan Felix [address]    TUMOR REMOVAL  2008    TSPR (Dr. Ronald Stringer)    UROLOGICAL SURGERY      cystoscopy     Medications: reviewed in chart   Allergies: Allergies   Allergen Reactions    Aspirin      Other reaction(s): Wheezing and/or shortness of breathe-Allergy        Diagnostic exams (per chart review):   MRI (12/15/2022)  evidence of prior rotator cuff repair without evidence of recurrent tear. Evidence of interval Scar procedure and subacromial decompression. Osteoarthritic change of the glenohumeral joint with irregular the glenohumeral cartilage, significant edema-like marrow signal within the humeral head and glenoid and subchondral cystic change.   Large glenohumeral joint effusion. SUBJECTIVE     Chief complaints/history of injury:   Date symptoms began: 01/2022  Kraig Sensing of condition: Chronic (continuous duration > 3 months)  Primary cause of current episode: Unspecified  How did symptoms start: patient reports no specific injury, only progressive onset of soreness and difficulty in motion  Describe current symptoms: soreness from [facility] to deltoid tuberosity    Received previous outpatient therapy? No    Pain Assessment:  Pain location: L GHJ, from ACJ to deltoid tuberosity (diffuse)    Average Pain/symptom intensity (0-10 scale)  Last 24 hours: 7/10  Last week (1-7 days): 7/10  How often do you feel symptoms? Frequently (51-75%)  Description: aching, dull, and sharp with use  Aggravating factors: reaching, lifting, and carrying  Alleviating factors: heat and avoid aggravating movements    Neuro screen: denies numbness, tingling, and radiating pain    Social/Functional Hx: How would you rate your overall health? poor  Pt lives with daughter in a(n) 1 story house. Current DME: none  Work Status: Retired ()  Sleep: moderately disturbed  PLOF & Social Hx/Interests: Independent and active without physical limitations and participated in drawing, painting  How much have your symptoms interfered with daily activities? Quite a bit  Current level of function:  avoiding aggravating motions    Patient Stated Goals: improve function    OBJECTIVE     Functional Outcome Questionnaire: QuickDASH:  47.7/100 = 47.7% functional deficit   Hand/Side Dominance: left handed  Observation:   Posture:  Forward head, Rounded shoulders, and Scapular position: abducted and upwardly rotated  Swelling/Edema: none  Palpation/joint mobility: L GHJ: mod restriction (posterior), max (inferior), TTP anterior/posterior joint line    A/PROM Measures:    Cervical: WNL    Shoulder (Supine) Right Left Comment (end feel)   Flexion 142 122    Abduction 140 125    Scaption 150 112    IR  52 43 PROM at 45° abd   ER 80 75 PROM at 45° abd   Functional  ER NT NT    Functional IR NT NT    Elbow WNL WNL      Strength/MMT (0-5 Scale): Shoulder Right Left Comment   Flexion 5 4-*    Abduction 5 4    Scaption 4+ 4*    IR  4+ 4-    ER 4 4    Elbow      Flexion 5 4*    Extension 5 4*     56.3 55      Special Tests/Function  Shoulder: IRRST: ER>IR  Labrum: Biceps Load: +  Crank: +    Treatment provided today consisted of initial evaluation followed by:  Manual therapy (45411) x 15 min utilizing techniques to improve joint and/or soft tissue mobility, ROM, and function as well as helping to decrease pain/spasms and swelling. Palpation and assessment of soft tissue, muscles, and landmarks   GHJ mobilization  Posterior (L) grade 1-3  Inferior (L) grade 1-3    Therapeutic exercise (25999) x 10 min to address ROM/strength deficits and to develop an initial HEP as noted below. Patient Education on the condition/pathology, involved anatomy, and exercise rationale. CLINICAL DECISION MAKING/ASSESSMENT     Personal Factors/co-morbidities affecting POC (1-2 Medium/3+High): handedness  past/current experiences  co-morbidities as previously noted   Problem List: (1-2 Low/ 3 Medium/ 4+ High) Pain  ROM limitations  Strength deficits  Motor control deficits  Decreased endurance/activity Tolerance  ADL/functional limitations/modifications  Restricted recreational participation  Difficulty sleeping    Clinical decision making: low complexity with therapist able to easily and confidently determine and predict expectations and future outcomes for the POC. Prognosis: good   Benefits and precautions of treatment explained to patient. Henry James is a 65 y.o. female who presents to therapy today with stable clinical presentation (low complexity) related to L GHJ pain. Presentation is consistent with osteoarthritis with indications of labral pathology.  Pt would benefit from skilled physical therapy services to address the deficits noted above for return to prior level of function. PLAN OF CARE     Effective Dates: 1/18/2023 TO 4/18/2023 (90 days). Frequency/Duration: 2x/week for 60 Day(s)  Interventions may include but are not limited to: (60346) Therapeutic exercise to develop ROM, strength, endurance and flexibility  (59895) Therapeutic activities using dynamic activities to improve function  (31596) Manual therapy techniques to improve joint and/or soft tissue mobility, ROM, and function as well as helping to decrease pain/spasms and swelling  (69382/76003) Dry needling for the management of neuromusculoskeletal pain and movement impairment  Home exercise program (HEP) development  Modalities prn to address pain, spasms, and swelling: (58531) Electrical stimulation - attended    The referring physician has reviewed and approved this evaluation and plan of care as noted by the electronic signature attached to note. GOALS     Short term goals to be met by 2/15/2023  (4 weeks):  Pt will demonstrate good recall of HEP requiring minimal verbal cuing for proper form and technique. Pt will wake </= 2 times/night to improve rest required for ADLs. Pt. will be minimally tender with palpation to improve ability to lie/sleep on side. Pt to subjectively report </= 4/10 pain to allow for increased participation in functional movements. Demonstrate AROM of involved shoulder that is within 5-10 degrees of the uninjured UE and does not limit function. Long term goals to be met by 3/15/2023  (8 weeks):  Pt to subjectively report </= 2/10 pain with shoulder movements to allow for return to PLOF. MMT involved Shoulder to >/= 4+/5 allowing for normal lifting, reaching and pushing/pulling associated with ADLs. Pt will report return to previous level of function without c/o pain. Improve QuickDASH Score to </=  20% impairment, demonstrating improved overall function.     [employer]  Access Code: [email or web address]  URL: [email or web address]  Date: 01/18/2023  Prepared by: Andreina Alaniz    Exercises  Supine Shoulder Flexion AAROM with Hands Clasped - 2 x daily - 2 sets - 15 reps - 2 hold  Supine Alternating Shoulder Flexion - 2 x daily - 2 sets - 15 reps - 2 hold  Seated Scapular Retraction - 2 x daily - 2 sets - 15 reps - 2 hold  Standing 'L' Stretch at Counter - 2 x daily - 2 sets - 15 reps - 2 hold

## 2023-01-30 ENCOUNTER — OFFICE VISIT (OUTPATIENT)
Age: 58
End: 2023-01-30
Payer: MEDICARE

## 2023-01-30 DIAGNOSIS — M25.512 CHRONIC LEFT SHOULDER PAIN: Primary | ICD-10-CM

## 2023-01-30 DIAGNOSIS — Z74.09 IMPAIRED MOBILITY AND ADLS: ICD-10-CM

## 2023-01-30 DIAGNOSIS — Z78.9 IMPAIRED MOBILITY AND ADLS: ICD-10-CM

## 2023-01-30 DIAGNOSIS — G89.29 CHRONIC LEFT SHOULDER PAIN: Primary | ICD-10-CM

## 2023-01-30 DIAGNOSIS — Z78.9 IMPAIRED MOTOR CONTROL: ICD-10-CM

## 2023-01-30 DIAGNOSIS — M19.012 OSTEOARTHRITIS OF GLENOHUMERAL JOINT, LEFT: ICD-10-CM

## 2023-01-30 DIAGNOSIS — Z74.09 DECREASED FUNCTIONAL MOBILITY AND ENDURANCE: ICD-10-CM

## 2023-01-30 DIAGNOSIS — M25.612 DECREASED RANGE OF MOTION OF LEFT SHOULDER: ICD-10-CM

## 2023-01-30 DIAGNOSIS — R29.898 SHOULDER WEAKNESS: ICD-10-CM

## 2023-01-30 PROCEDURE — 97110 THERAPEUTIC EXERCISES: CPT

## 2023-01-30 PROCEDURE — 97140 MANUAL THERAPY 1/> REGIONS: CPT

## 2023-01-30 NOTE — PROGRESS NOTES
[address]  [address] [facility]  [address] [address]  Dept: [phone]      Physical Therapy Daily Note     Referring MD: Richard Castle, APRN - *  Diagnosis:     ICD-10-CM    1. Chronic left shoulder pain  M25.512     G89.29       2. Osteoarthritis of glenohumeral joint, left  M19.012       3. Impaired mobility and ADLs  Z74.09     Z78.9       4. Decreased functional mobility and endurance  Z74.09       5. Impaired motor control  Z78.9       6. Decreased range of motion of left shoulder  M25.612       7. Shoulder weakness  R29.898          Therapy precautions:None  Co-morbidities affecting plan of care: hx of pituitary adenoma, RTC repair (L, 2017)    PERTINENT MEDICAL HISTORY     Past medical and surgical history:   Past Medical History:   Diagnosis Date    Gastroesophageal reflux disease     Hiatal hernia     Hypertension     Irritable bowel syndrome     Obstructive sleep apnea     Osteopenia     Pituitary adenoma ([address]     Vitamin D deficiency       Past Surgical History:   Procedure Laterality Date    BREAST BIOPSY Right 2012    benign    HYSTERECTOMY (CERVIX STATUS UNKNOWN)  1997    ovaries intact    LAPAROSCOPY      ROTATOR CUFF REPAIR Left 2017    E[address] Felix [address]    TUMOR REMOVAL  2008    TSPR (Dr. Erna Butler)    UROLOGICAL SURGERY      cystoscopy     Medications: reviewed in chart   Allergies: Allergies   Allergen Reactions    Aspirin      Other reaction(s): Wheezing and/or shortness of breathe-Allergy      Chief complaints/history of injury:   Date symptoms began: 01/2022  Naomi Math of condition: Chronic (continuous duration > 3 months)  Primary cause of current episode: Unspecified  How did symptoms start: patient reports no specific injury, only progressive onset of soreness and difficulty in motion  Describe current symptoms: soreness from [facility] [place], operated by [facility] to deltoid tuberosity    Received previous outpatient therapy?  No    Pain Assessment:  Pain location: L GHJ, from ACJ to deltoid tuberosity (diffuse)    Average Pain/symptom intensity (0-10 scale)  Last 24 hours: 7/10  Last week (1-7 days): 7/10  How often do you feel symptoms? Frequently (51-75%)  Description: aching, dull, and sharp with use  Aggravating factors: reaching, lifting, and carrying  Alleviating factors: heat and avoid aggravating movements    Neuro screen: denies numbness, tingling, and radiating pain    Patient Stated Goals: improve function    Initial Evaluation: 1/18/23  Last Progress Note: n/a  Total Visits: 2    Total Timed Codes: 45 min, Total Treatment Time: 45 min    SUBJECTIVE     Pt reports her shoulder still feels sore, but that she has been able to use it more and is happy to show how well it is moving. She has been performing her HEP regularly, but was unable to attend treatment last week due to a family matter. OBJECTIVE     Hand/Side Dominance: left handed  Observation:   Posture: Forward head, Rounded shoulders, and Scapular position: abducted and upwardly rotated  Swelling/Edema: none  Palpation/joint mobility: L GHJ: mod restriction (posterior), mod (inferior), TTP anterior/posterior joint line    A/PROM Measures:        Treatment provided today:  Manual therapy (43114) x 15 min utilizing techniques to improve joint and/or soft tissue mobility, ROM, and function as well as helping to decrease pain/spasms and swelling.   Palpation and assessment of soft tissue, muscles, and landmarks   GHJ mobilization  Posterior (L) grade 1-4  Inferior (L) grade 1-4  PNF  Initiation  Posterior humeral glide (R)    Therapeutic exercise (71358) x 30 min to address ROM/strength deficits  PROM  MWM  Flexion  Scaption  AROM  Supine  Flexion  Sidelying  Abduction  Scapular mobilization  Protraction/retraction  Scapular sequence, 2min each  Henderson at head  Row  Mid (light)  Extension (light)  Isometrics (B, red playground ball, 5s hold) 60s each  0 abduction  ER  IR  Counter L stretch (return to retraction)    Patient Education on rationale for activities, HEP advancement as detailed below    ASSESSMENT     Patient demonstrates: Moderate improvement in functional mobility on presentation  Deficits in GHJ mobility as detailed below     She has improvements in:  AROM in supine and sidelying, with increased tolerance of motion  Load tolerance, with introduction of scapular sequencing with resistance and isometrics     Continues with:  Deficits in ROM, strength, and function of the UQ  Presentation is consistent with osteoarthritis with indications of labral pathology    PLAN     Continue therapy according to POC. Manual treatment for improved mobility and symptom modulation. Progress scapular stabilization with increased loading and dynamic motion. Introduce functional tasks as appropriate. PLAN OF CARE     Effective Dates: 1/18/2023 TO 4/18/2023 (90 days). Frequency/Duration: 2x/week for 60 Day(s)  Interventions may include but are not limited to: (57945) Therapeutic exercise to develop ROM, strength, endurance and flexibility  (76545) Therapeutic activities using dynamic activities to improve function  (24338) Manual therapy techniques to improve joint and/or soft tissue mobility, ROM, and function as well as helping to decrease pain/spasms and swelling  (76878/70308) Dry needling for the management of neuromusculoskeletal pain and movement impairment  Home exercise program (HEP) development  Modalities prn to address pain, spasms, and swelling: (02000) Electrical stimulation - attended    GOALS     Short term goals to be met by 2/15/2023  (4 weeks):  Pt will demonstrate good recall of HEP requiring minimal verbal cuing for proper form and technique. Pt will wake </= 2 times/night to improve rest required for ADLs. Pt. will be minimally tender with palpation to improve ability to lie/sleep on side. Pt to subjectively report </= 4/10 pain to allow for increased participation in functional movements.   Demonstrate AROM of involved shoulder that is within 5-10 degrees of the uninjured UE and does not limit function. Long term goals to be met by 3/15/2023  (8 weeks):  Pt to subjectively report </= 2/10 pain with shoulder movements to allow for return to PLOF. MMT involved Shoulder to >/= 4+/5 allowing for normal lifting, reaching and pushing/pulling associated with ADLs. Pt will report return to previous level of function without c/o pain. Improve QuickDASH Score to </=  20% impairment, demonstrating improved overall function. [employer]  Access Code: [email or web address]  URL: [email or web address]  Date: 01/30/2023  Prepared by: Humble Kearney    Exercises  Seated Scapular Protraction and Retraction - 2 x daily - 2 sets - 20 reps - 2 hold  Supine Alternating Shoulder Flexion - 2 x daily - 2 sets - 20 reps - 2 hold  Sidelying Shoulder Abduction Palm Forward - 2 x daily - 2 sets - 20 reps  Standing 'L' Stretch at Counter - 2 x daily - 2 sets - 15 reps - 2 hold

## 2023-02-02 ENCOUNTER — OFFICE VISIT (OUTPATIENT)
Age: 58
End: 2023-02-02
Payer: MEDICARE

## 2023-02-02 DIAGNOSIS — M25.512 CHRONIC LEFT SHOULDER PAIN: Primary | ICD-10-CM

## 2023-02-02 DIAGNOSIS — Z74.09 DECREASED FUNCTIONAL MOBILITY AND ENDURANCE: ICD-10-CM

## 2023-02-02 DIAGNOSIS — Z74.09 IMPAIRED MOBILITY AND ADLS: ICD-10-CM

## 2023-02-02 DIAGNOSIS — M19.012 OSTEOARTHRITIS OF GLENOHUMERAL JOINT, LEFT: ICD-10-CM

## 2023-02-02 DIAGNOSIS — G89.29 CHRONIC LEFT SHOULDER PAIN: Primary | ICD-10-CM

## 2023-02-02 DIAGNOSIS — Z78.9 IMPAIRED MOBILITY AND ADLS: ICD-10-CM

## 2023-02-02 DIAGNOSIS — Z78.9 IMPAIRED MOTOR CONTROL: ICD-10-CM

## 2023-02-02 PROCEDURE — 97110 THERAPEUTIC EXERCISES: CPT

## 2023-02-02 PROCEDURE — 97140 MANUAL THERAPY 1/> REGIONS: CPT

## 2023-02-02 NOTE — PROGRESS NOTES
[address]  [address]  [place] [address]  Dept: [phone]      Physical Therapy Daily Note     Referring MD: NICK Cohen - *  Diagnosis:     ICD-10-CM    1. Chronic left shoulder pain  M25.512     G89.29       2. Osteoarthritis of glenohumeral joint, left  M19.012       3. Impaired mobility and ADLs  Z74.09     Z78.9       4. Decreased functional mobility and endurance  Z74.09       5. Impaired motor control  Z78.9          Therapy precautions:None  Co-morbidities affecting plan of care: hx of pituitary adenoma, RTC repair (L, 2017)    PERTINENT MEDICAL HISTORY     Past medical and surgical history:   Past Medical History:   Diagnosis Date    Gastroesophageal reflux disease     Hiatal hernia     Hypertension     Irritable bowel syndrome     Obstructive sleep apnea     Osteopenia     Pituitary adenoma ([address]     Vitamin D deficiency       Past Surgical History:   Procedure Laterality Date    BREAST BIOPSY Right 2012    benign    HYSTERECTOMY (CERVIX STATUS UNKNOWN)  1997    ovaries intact    LAPAROSCOPY      ROTATOR CUFF REPAIR Left 2017    Ethan Felix 10    TUMOR REMOVAL  2008    TSPR (Dr. Michelle Silver)    UROLOGICAL SURGERY      cystoscopy     Medications: reviewed in chart   Allergies: Allergies   Allergen Reactions    Aspirin      Other reaction(s): Wheezing and/or shortness of breathe-Allergy      Chief complaints/history of injury:   Date symptoms began: 01/2022  Nick Raffi of condition: Chronic (continuous duration > 3 months)  Primary cause of current episode: Unspecified  How did symptoms start: patient reports no specific injury, only progressive onset of soreness and difficulty in motion  Describe current symptoms: soreness from [facility] to deltoid tuberosity    Received previous outpatient therapy?  No    Pain Assessment:  Pain location: L GHJ, from ACJ to deltoid tuberosity (diffuse)    Average Pain/symptom intensity (0-10 scale)  Last 24 hours: 7/10  Last week (1-7 days): 7/10  How often do you feel symptoms? Frequently (51-75%)  Description: aching, dull, and sharp with use  Aggravating factors: reaching, lifting, and carrying  Alleviating factors: heat and avoid aggravating movements    Neuro screen: denies numbness, tingling, and radiating pain    Patient Stated Goals: improve function    Initial Evaluation: 1/18/23  Last Progress Note: n/a  Total Visits: 3    Total Timed Codes: 45 min, Total Treatment Time: 45 min    SUBJECTIVE     Pt reports continued soreness, but more stiffness today in her shoulder attributed to the rain. She continues to use it more in ADLs, and notes improved functional ER, allowing her to perform UQ grooming better. OBJECTIVE     Hand/Side Dominance: left handed  Observation:   Posture: Forward head, Rounded shoulders, and Scapular position: abducted and upwardly rotated  Swelling/Edema: none  Palpation/joint mobility: L GHJ: mod restriction (posterior), mod (inferior), TTP anterior/posterior joint line, hypertonic subscapularis (L, mod with isolated trigger points)    A/PROM Measures:    Functional  ER T4 Suboccipitals        Treatment provided today:  Manual therapy (84270) x 15 min utilizing techniques to improve joint and/or soft tissue mobility, ROM, and function as well as helping to decrease pain/spasms and swelling.   Palpation and assessment of soft tissue, muscles, and landmarks   STM (passive release)  subscapularis  GHJ mobilization  Posterior (L) grade 2-4  Inferior (L) grade 2-4    Therapeutic exercise (10785) x 30 min to address ROM/strength deficits  PROM  MWM  Scapular block  Abduction  Posterior glide  Flexion  Scaption  AROM  Sidelying  Abduction  30 degrees  Flexion (available ROM, TC for posterior humeral glide)  Isometrics (B, red playground ball, 5s hold) 60s each  0 abduction  Flexion  Abduction  Extension  Scapular sequence, 2min each  [place] at head  Row  Mid (light) 2min  Extension (light) 2min  [place] at knees  ER (W's, v light band) 2min    Patient Education on rationale for activities, progression of ROM with continued strengthening    ASSESSMENT     Patient demonstrates: Moderate improvement in functional mobility on presentation  Deficits in GHJ mobility and scapular muscular tone as detailed above     She has improvements in:  AROM globally, with functional ER to T2 s/p treatment and overall increased tolerance of motion  Load tolerance, with progression of scapular sequencing with increased resistance and duration     Continues with:  Deficits in ROM, strength, and function of the UQ  Presentation is consistent with osteoarthritis with indications of labral pathology    PLAN     Continue therapy according to POC. Manual treatment for improved mobility and symptom modulation. Progress scapular stabilization with increased loading and dynamic motion. Introduce functional tasks as appropriate. Progress HEP to include isometrics at next visit. PLAN OF CARE     Effective Dates: 1/18/2023 TO 4/18/2023 (90 days). Frequency/Duration: 2x/week for 60 Day(s)  Interventions may include but are not limited to: (07897) Therapeutic exercise to develop ROM, strength, endurance and flexibility  (59221) Therapeutic activities using dynamic activities to improve function  (08275) Manual therapy techniques to improve joint and/or soft tissue mobility, ROM, and function as well as helping to decrease pain/spasms and swelling  (27583/98111) Dry needling for the management of neuromusculoskeletal pain and movement impairment  Home exercise program (HEP) development  Modalities prn to address pain, spasms, and swelling: (07209) Electrical stimulation - attended    GOALS     Short term goals to be met by 2/15/2023  (4 weeks):  Pt will demonstrate good recall of HEP requiring minimal verbal cuing for proper form and technique. Pt will wake </= 2 times/night to improve rest required for ADLs.     Pt. will be minimally tender with palpation to improve ability to lie/sleep on side. Pt to subjectively report </= 4/10 pain to allow for increased participation in functional movements. Demonstrate AROM of involved shoulder that is within 5-10 degrees of the uninjured UE and does not limit function. Long term goals to be met by 3/15/2023  (8 weeks):  Pt to subjectively report </= 2/10 pain with shoulder movements to allow for return to PLOF. MMT involved Shoulder to >/= 4+/5 allowing for normal lifting, reaching and pushing/pulling associated with ADLs. Pt will report return to previous level of function without c/o pain. Improve QuickDASH Score to </=  20% impairment, demonstrating improved overall function. [employer]  Access Code: [email or web address]  URL: [email or web address]  Date: 01/30/2023  Prepared by: Karene Band    Exercises  Seated Scapular Protraction and Retraction - 2 x daily - 2 sets - 20 reps - 2 hold  Supine Alternating Shoulder Flexion - 2 x daily - 2 sets - 20 reps - 2 hold  Sidelying Shoulder Abduction Palm Forward - 2 x daily - 2 sets - 20 reps  Standing 'L' Stretch at Counter - 2 x daily - 2 sets - 15 reps - 2 hold

## 2023-02-06 ENCOUNTER — OFFICE VISIT (OUTPATIENT)
Age: 58
End: 2023-02-06
Payer: MEDICARE

## 2023-02-06 DIAGNOSIS — Z78.9 IMPAIRED MOBILITY AND ADLS: ICD-10-CM

## 2023-02-06 DIAGNOSIS — M19.012 OSTEOARTHRITIS OF GLENOHUMERAL JOINT, LEFT: ICD-10-CM

## 2023-02-06 DIAGNOSIS — M25.512 CHRONIC LEFT SHOULDER PAIN: Primary | ICD-10-CM

## 2023-02-06 DIAGNOSIS — Z74.09 DECREASED FUNCTIONAL MOBILITY AND ENDURANCE: ICD-10-CM

## 2023-02-06 DIAGNOSIS — Z78.9 IMPAIRED MOTOR CONTROL: ICD-10-CM

## 2023-02-06 DIAGNOSIS — Z74.09 IMPAIRED MOBILITY AND ADLS: ICD-10-CM

## 2023-02-06 DIAGNOSIS — M25.612 DECREASED RANGE OF MOTION OF LEFT SHOULDER: ICD-10-CM

## 2023-02-06 DIAGNOSIS — R29.898 SHOULDER WEAKNESS: ICD-10-CM

## 2023-02-06 DIAGNOSIS — G89.29 CHRONIC LEFT SHOULDER PAIN: Primary | ICD-10-CM

## 2023-02-06 PROCEDURE — 97140 MANUAL THERAPY 1/> REGIONS: CPT

## 2023-02-06 PROCEDURE — 97110 THERAPEUTIC EXERCISES: CPT

## 2023-02-06 NOTE — PROGRESS NOTES
[address]  [address] [address]  Dept: [phone]      Physical Therapy Daily Note     Referring MD: NICK Botello - *  Diagnosis:     ICD-10-CM    1. Chronic left shoulder pain  M25.512     G89.29       2. Osteoarthritis of glenohumeral joint, left  M19.012       3. Impaired mobility and ADLs  Z74.09     Z78.9       4. Decreased functional mobility and endurance  Z74.09       5. Impaired motor control  Z78.9       6. Decreased range of motion of left shoulder  M25.612       7. Shoulder weakness  R29.898          Therapy precautions:None  Co-morbidities affecting plan of care: hx of pituitary adenoma, RTC repair (L, 2017)    PERTINENT MEDICAL HISTORY     Past medical and surgical history:   Past Medical History:   Diagnosis Date    Gastroesophageal reflux disease     Hiatal hernia     Hypertension     Irritable bowel syndrome     Obstructive sleep apnea     Osteopenia     Pituitary adenoma ([address]     Vitamin D deficiency       Past Surgical History:   Procedure Laterality Date    BREAST BIOPSY Right 2012    benign    HYSTERECTOMY (CERVIX STATUS UNKNOWN)  1997    ovaries intact    LAPAROSCOPY      ROTATOR CUFF REPAIR Left 2017    Ethan Felix 11    TUMOR REMOVAL  2008    TSPR (Dr. Marcella Nelson)    UROLOGICAL SURGERY      cystoscopy     Medications: reviewed in chart   Allergies: Allergies   Allergen Reactions    Aspirin      Other reaction(s): Wheezing and/or shortness of breathe-Allergy      Chief complaints/history of injury:   Date symptoms began: 01/2022  Cowinifred Canalesico of condition: Chronic (continuous duration > 3 months)  Primary cause of current episode: Unspecified  How did symptoms start: patient reports no specific injury, only progressive onset of soreness and difficulty in motion  Describe current symptoms: soreness from [facility] to deltoid tuberosity    Received previous outpatient therapy?  No    Pain Assessment:  Pain location: L GHJ, from ACJ to deltoid tuberosity (diffuse)    Average Pain/symptom intensity (0-10 scale)  Last 24 hours: 7/10  Last week (1-7 days): 7/10  How often do you feel symptoms? Frequently (51-75%)  Description: aching, dull, and sharp with use  Aggravating factors: reaching, lifting, and carrying  Alleviating factors: heat and avoid aggravating movements    Neuro screen: denies numbness, tingling, and radiating pain    Patient Stated Goals: improve function    Initial Evaluation: 1/18/23  Last Progress Note: n/a  Total Visits: 3    Total Timed Codes: 40 min, Total Treatment Time: 40 min    SUBJECTIVE     Pt reports a general soreness this morning throughout her body, with her shoulder not feeling any worse than other areas. She continues to have increased motion and activity tolerance. OBJECTIVE     Hand/Side Dominance: left handed  Observation:   Posture: Forward head, Rounded shoulders, and Scapular position: abducted and upwardly rotated  Swelling/Edema: none  Palpation/joint mobility: L GHJ: mod restriction (posterior), mod (inferior),  hypertonic subscapularis (L, mod with isolated trigger points)    A/PROM Measures:    Functional  ER T4 C7        Treatment provided today:  Manual therapy (82615) x 15 min utilizing techniques to improve joint and/or soft tissue mobility, ROM, and function as well as helping to decrease pain/spasms and swelling.   Palpation and assessment of soft tissue, muscles, and landmarks   GHJ mobilization  Posterior (L) grade 2-4  Inferior (L) grade 2-4  STM (passive release)  subscapularis    Therapeutic exercise (42227) x 25 min to address ROM/strength deficits  UBE (L1, fwd) 5min  PROM  MWM  Scapular block  Abduction  Posterior glide  Flexion  Scaption  Scapular sequence, 2min each  [place] at head  Row  Mid (light) 2min  Horizontal abduction (v light, 45 degrees) 60s  Extension (light) 2min  [place] at knees  ER (W's, v light band) 2min  Isometrics (B, red playground ball, 5s hold) 60s each  0 abduction  Flexion  Abduction  Extension    Patient Education on rationale for activities, HEP update as detailed below    ASSESSMENT     Patient demonstrates: Moderate improvement in functional mobility, with ER to C7  Deficits in GHJ mobility and scapular muscular tone as detailed above     She has improvements in:  AROM globally, with functional ER to T2 s/p treatment and overall increased tolerance of motion  Load tolerance, with progression of scapular sequencing and isometrics with increased resistance and duration     Continues with:  Deficits in ROM, strength, and function of the UQ  Presentation is consistent with osteoarthritis with indications of labral pathology    PLAN     Continue therapy according to POC. Manual treatment for improved mobility and symptom modulation. Progress scapular stabilization with increased loading and dynamic motion. Introduce functional tasks as appropriate. PLAN OF CARE     Effective Dates: 1/18/2023 TO 4/18/2023 (90 days). Frequency/Duration: 2x/week for 60 Day(s)  Interventions may include but are not limited to: (63540) Therapeutic exercise to develop ROM, strength, endurance and flexibility  (57543) Therapeutic activities using dynamic activities to improve function  (17298) Manual therapy techniques to improve joint and/or soft tissue mobility, ROM, and function as well as helping to decrease pain/spasms and swelling  (13149/80823) Dry needling for the management of neuromusculoskeletal pain and movement impairment  Home exercise program (HEP) development  Modalities prn to address pain, spasms, and swelling: (69970) Electrical stimulation - attended    GOALS     Short term goals to be met by 2/15/2023  (4 weeks):  Pt will demonstrate good recall of HEP requiring minimal verbal cuing for proper form and technique. Pt will wake </= 2 times/night to improve rest required for ADLs.     Pt. will be minimally tender with palpation to improve ability to lie/sleep on side.  Pt to subjectively report </= 4/10 pain to allow for increased participation in functional movements. Demonstrate AROM of involved shoulder that is within 5-10 degrees of the uninjured UE and does not limit function. Long term goals to be met by 3/15/2023  (8 weeks):  Pt to subjectively report </= 2/10 pain with shoulder movements to allow for return to PLOF. MMT involved Shoulder to >/= 4+/5 allowing for normal lifting, reaching and pushing/pulling associated with ADLs. Pt will report return to previous level of function without c/o pain. Improve QuickDASH Score to </=  20% impairment, demonstrating improved overall function. [employer]  Access Code: [email or web address]  URL: [email or web address]  Date: 02/06/2023  Prepared by: Keyonna Rhodes    Exercises  Seated Scapular Protraction and Retraction - 2 x daily - 2 sets - 20 reps - 2 hold  Supine Alternating Shoulder Flexion - 2 x daily - 2 sets - 20 reps - 2 hold  Sidelying Shoulder Abduction Palm Forward - 2 x daily - 2 sets - 20 reps  Standing 'L' Stretch at Counter - 2 x daily - 2 sets - 15 reps - 2 hold  Isometric Shoulder Flexion at Wall - 2 x daily - 20 reps - 5 hold  Isometric Shoulder Abduction at Wall - 2 x daily - 20 reps - 5 hold  Isometric Shoulder Extension at Wall - 2 x daily - 20 reps - 5 hold

## 2023-02-09 ENCOUNTER — OFFICE VISIT (OUTPATIENT)
Age: 58
End: 2023-02-09
Payer: MEDICARE

## 2023-02-09 DIAGNOSIS — Z78.9 IMPAIRED MOTOR CONTROL: ICD-10-CM

## 2023-02-09 DIAGNOSIS — M25.512 CHRONIC LEFT SHOULDER PAIN: Primary | ICD-10-CM

## 2023-02-09 DIAGNOSIS — Z74.09 IMPAIRED MOBILITY AND ADLS: ICD-10-CM

## 2023-02-09 DIAGNOSIS — M19.012 OSTEOARTHRITIS OF GLENOHUMERAL JOINT, LEFT: ICD-10-CM

## 2023-02-09 DIAGNOSIS — M25.612 DECREASED RANGE OF MOTION OF LEFT SHOULDER: ICD-10-CM

## 2023-02-09 DIAGNOSIS — R29.898 SHOULDER WEAKNESS: ICD-10-CM

## 2023-02-09 DIAGNOSIS — Z78.9 IMPAIRED MOBILITY AND ADLS: ICD-10-CM

## 2023-02-09 DIAGNOSIS — G89.29 CHRONIC LEFT SHOULDER PAIN: Primary | ICD-10-CM

## 2023-02-09 DIAGNOSIS — Z74.09 DECREASED FUNCTIONAL MOBILITY AND ENDURANCE: ICD-10-CM

## 2023-02-09 PROCEDURE — 97110 THERAPEUTIC EXERCISES: CPT

## 2023-02-09 PROCEDURE — 97140 MANUAL THERAPY 1/> REGIONS: CPT

## 2023-02-09 NOTE — PROGRESS NOTES
[address]  [address] [address]  Dept: [phone]      Physical Therapy Daily Note     Referring MD: NICK Botello - *  Diagnosis:     ICD-10-CM    1. Chronic left shoulder pain  M25.512     G89.29       2. Osteoarthritis of glenohumeral joint, left  M19.012       3. Impaired mobility and ADLs  Z74.09     Z78.9       4. Decreased functional mobility and endurance  Z74.09       5. Impaired motor control  Z78.9       6. Decreased range of motion of left shoulder  M25.612       7. Shoulder weakness  R29.898          Therapy precautions:None  Co-morbidities affecting plan of care: hx of pituitary adenoma, RTC repair (L, 2017)    PERTINENT MEDICAL HISTORY     Past medical and surgical history:   Past Medical History:   Diagnosis Date    Gastroesophageal reflux disease     Hiatal hernia     Hypertension     Irritable bowel syndrome     Obstructive sleep apnea     Osteopenia     Pituitary adenoma ([address]     Vitamin D deficiency       Past Surgical History:   Procedure Laterality Date    BREAST BIOPSY Right 2012    benign    HYSTERECTOMY (CERVIX STATUS UNKNOWN)  1997    ovaries intact    LAPAROSCOPY      ROTATOR CUFF REPAIR Left 2017    Ethan Felix 11    TUMOR REMOVAL  2008    TSPR (Dr. Marcella Nelson)    UROLOGICAL SURGERY      cystoscopy     Medications: reviewed in chart   Allergies: Allergies   Allergen Reactions    Aspirin      Other reaction(s): Wheezing and/or shortness of breathe-Allergy      Chief complaints/history of injury:   Date symptoms began: 01/2022  Jimenez Aguilar of condition: Chronic (continuous duration > 3 months)  Primary cause of current episode: Unspecified  How did symptoms start: patient reports no specific injury, only progressive onset of soreness and difficulty in motion  Describe current symptoms: soreness from [facility] to deltoid tuberosity    Received previous outpatient therapy?  No    Pain Assessment:  Pain location: L GHJ, from ACJ to deltoid tuberosity (diffuse)    Average Pain/symptom intensity (0-10 scale)  Last 24 hours: 7/10  Last week (1-7 days): 7/10  How often do you feel symptoms? Frequently (51-75%)  Description: aching, dull, and sharp with use  Aggravating factors: reaching, lifting, and carrying  Alleviating factors: heat and avoid aggravating movements    Neuro screen: denies numbness, tingling, and radiating pain    Patient Stated Goals: improve function    Initial Evaluation: 1/18/23  Last Progress Note: n/a  Total Visits: 4    Total Timed Codes: 40 min, Total Treatment Time: 40 min    SUBJECTIVE     Pt reports her shoulder is feeling more sore today than the last several days. She attributes this to the changing weather, causing a bit of an arthritic flare. She has continued to perform her HEP, but notes she has been reducing the duration due to soreness. OBJECTIVE     Hand/Side Dominance: left handed  Observation:   Posture: Forward head, Rounded shoulders, and Scapular position: abducted and upwardly rotated  Swelling/Edema: none  Palpation/joint mobility: L GHJ: mod restriction (posterior), mod (inferior), TTP anteriorjoint line  A/PROM Measures:    Functional  ER T4 C4        Treatment provided today:  Manual therapy (79493) x 15 min utilizing techniques to improve joint and/or soft tissue mobility, ROM, and function as well as helping to decrease pain/spasms and swelling. Palpation and assessment of soft tissue, muscles, and landmarks   GHJ mobilization  Posterior (L) grade 2-4  Inferior (L) grade 2-4    Therapeutic exercise (73249) x 25 min to address ROM/strength deficits  PROM  MWM  Scapular block  Abduction  Posterior glide  Flexion  Scaption  D2  UBE (L1, fwd/back, 3/3) 6min  Scapular sequence,  [place] at head 2x60s each  Row  Mid (med)  High (v light)  Horizontal abduction (v light)  Extension (med)    Patient Education on rationale for activities, use of motion to alleviate soreness and stiffness.     ASSESSMENT     Patient demonstrates:  Mild loss of functional mobility, with ER at C4 on presentation  Deficits in GHJ mobility as detailed above     She has improvements in:  AROM globally, with functional ER to C7 s/p treatment and overall increased tolerance of motion  Load tolerance, progressing scapular sequencing increased resistance, duration, and movement complexity     Continues with:  Deficits in ROM, strength, and function of the UQ  Presentation is consistent with osteoarthritis with indications of labral pathology    PLAN     Continue therapy according to POC. Manual treatment for improved mobility and symptom modulation. Progress scapular stabilization with increased loading and dynamic motion. Introduce functional tasks as appropriate. PLAN OF CARE     Effective Dates: 1/18/2023 TO 4/18/2023 (90 days). Frequency/Duration: 2x/week for 60 Day(s)  Interventions may include but are not limited to: (18534) Therapeutic exercise to develop ROM, strength, endurance and flexibility  (56466) Therapeutic activities using dynamic activities to improve function  (70323) Manual therapy techniques to improve joint and/or soft tissue mobility, ROM, and function as well as helping to decrease pain/spasms and swelling  (26353/84238) Dry needling for the management of neuromusculoskeletal pain and movement impairment  Home exercise program (HEP) development  Modalities prn to address pain, spasms, and swelling: (80585) Electrical stimulation - attended    GOALS     Short term goals to be met by 2/15/2023  (4 weeks):  Pt will demonstrate good recall of HEP requiring minimal verbal cuing for proper form and technique. Pt will wake </= 2 times/night to improve rest required for ADLs. Pt. will be minimally tender with palpation to improve ability to lie/sleep on side. Pt to subjectively report </= 4/10 pain to allow for increased participation in functional movements.   Demonstrate AROM of involved shoulder that is within 5-10 degrees of the uninjured UE and does not limit function. Long term goals to be met by 3/15/2023  (8 weeks):  Pt to subjectively report </= 2/10 pain with shoulder movements to allow for return to PLOF. MMT involved Shoulder to >/= 4+/5 allowing for normal lifting, reaching and pushing/pulling associated with ADLs. Pt will report return to previous level of function without c/o pain. Improve QuickDASH Score to </=  20% impairment, demonstrating improved overall function. [employer]  Access Code: [email or web address]  URL: [email or web address]  Date: 02/06/2023  Prepared by: Zach Major    Exercises  Seated Scapular Protraction and Retraction - 2 x daily - 2 sets - 20 reps - 2 hold  Supine Alternating Shoulder Flexion - 2 x daily - 2 sets - 20 reps - 2 hold  Sidelying Shoulder Abduction Palm Forward - 2 x daily - 2 sets - 20 reps  Standing 'L' Stretch at Counter - 2 x daily - 2 sets - 15 reps - 2 hold  Isometric Shoulder Flexion at Wall - 2 x daily - 20 reps - 5 hold  Isometric Shoulder Abduction at Wall - 2 x daily - 20 reps - 5 hold  Isometric Shoulder Extension at Wall - 2 x daily - 20 reps - 5 hold

## 2023-02-13 ENCOUNTER — OFFICE VISIT (OUTPATIENT)
Age: 58
End: 2023-02-13
Payer: MEDICARE

## 2023-02-13 DIAGNOSIS — Z78.9 IMPAIRED MOTOR CONTROL: ICD-10-CM

## 2023-02-13 DIAGNOSIS — Z74.09 DECREASED FUNCTIONAL MOBILITY AND ENDURANCE: ICD-10-CM

## 2023-02-13 DIAGNOSIS — G89.29 CHRONIC LEFT SHOULDER PAIN: Primary | ICD-10-CM

## 2023-02-13 DIAGNOSIS — Z74.09 IMPAIRED MOBILITY AND ADLS: ICD-10-CM

## 2023-02-13 DIAGNOSIS — M25.512 CHRONIC LEFT SHOULDER PAIN: Primary | ICD-10-CM

## 2023-02-13 DIAGNOSIS — Z78.9 IMPAIRED MOBILITY AND ADLS: ICD-10-CM

## 2023-02-13 DIAGNOSIS — R29.898 SHOULDER WEAKNESS: ICD-10-CM

## 2023-02-13 DIAGNOSIS — M25.612 DECREASED RANGE OF MOTION OF LEFT SHOULDER: ICD-10-CM

## 2023-02-13 DIAGNOSIS — M19.012 OSTEOARTHRITIS OF GLENOHUMERAL JOINT, LEFT: ICD-10-CM

## 2023-02-13 PROCEDURE — 97140 MANUAL THERAPY 1/> REGIONS: CPT

## 2023-02-13 PROCEDURE — 97110 THERAPEUTIC EXERCISES: CPT

## 2023-02-13 NOTE — PROGRESS NOTES
[address]  [address] [address]  Dept: [phone]      Physical Therapy Daily Note     Referring MD: NICK Nicole - *  Diagnosis:     ICD-10-CM    1. Chronic left shoulder pain  M25.512     G89.29       2. Osteoarthritis of glenohumeral joint, left  M19.012       3. Impaired mobility and ADLs  Z74.09     Z78.9       4. Decreased functional mobility and endurance  Z74.09       5. Impaired motor control  Z78.9       6. Decreased range of motion of left shoulder  M25.612       7. Shoulder weakness  R29.898          Therapy precautions:None  Co-morbidities affecting plan of care: hx of pituitary adenoma, RTC repair (L, 2017)    PERTINENT MEDICAL HISTORY     Past medical and surgical history:   Past Medical History:   Diagnosis Date    Gastroesophageal reflux disease     Hiatal hernia     Hypertension     Irritable bowel syndrome     Obstructive sleep apnea     Osteopenia     Pituitary adenoma ([address]     Vitamin D deficiency       Past Surgical History:   Procedure Laterality Date    BREAST BIOPSY Right 2012    benign    HYSTERECTOMY (CERVIX STATUS UNKNOWN)  1997    ovaries intact    LAPAROSCOPY      ROTATOR CUFF REPAIR Left 2017    Ethan Felix 10    TUMOR REMOVAL  2008    TSPR (Dr. Alethea Parker)    UROLOGICAL SURGERY      cystoscopy     Medications: reviewed in chart   Allergies: Allergies   Allergen Reactions    Aspirin      Other reaction(s): Wheezing and/or shortness of breathe-Allergy      Chief complaints/history of injury:   Date symptoms began: 01/2022  Rojelio Clark of condition: Chronic (continuous duration > 3 months)  Primary cause of current episode: Unspecified  How did symptoms start: patient reports no specific injury, only progressive onset of soreness and difficulty in motion  Describe current symptoms: soreness from [facility] to deltoid tuberosity    Received previous outpatient therapy?  No    Pain Assessment:  Pain location: L GHJ, from ACJ to deltoid tuberosity (diffuse)    Average Pain/symptom intensity (0-10 scale)  Last 24 hours: 7/10  Last week (1-7 days): 7/10  How often do you feel symptoms? Frequently (51-75%)  Description: aching, dull, and sharp with use  Aggravating factors: reaching, lifting, and carrying  Alleviating factors: heat and avoid aggravating movements    Neuro screen: denies numbness, tingling, and radiating pain    Patient Stated Goals: improve function    Initial Evaluation: 1/18/23  Last Progress Note: n/a  Total Visits: 4    Total Timed Codes: 40 min, Total Treatment Time: 40 min    SUBJECTIVE     Pt reports her shoulder is feeling good this morning, and she is surprised considering it was quite sore with the cold, wet weather over the weekend. She is happy to note she was able to comb her hair this morning, and she has been moving better without any 'pinching' sensations. OBJECTIVE     Hand/Side Dominance: left handed  Observation:   Posture: Forward head, Rounded shoulders, and Scapular position: abducted and upwardly rotated  Swelling/Edema: none  Palpation/joint mobility: L GHJ: mild restriction (posterior, inferior), TTP anteriorjoint line  A/PROM Measures:  Restrictions: Flexion (R mod) abduction (R mod) with mod scapular elevation    Functional  ER T4 C7        Treatment provided today:  Manual therapy (58414) x 15 min utilizing techniques to improve joint and/or soft tissue mobility, ROM, and function as well as helping to decrease pain/spasms and swelling.   Palpation and assessment of soft tissue, muscles, and landmarks   GHJ mobilization  Posterior (L) grade 2-4  Inferior (L) grade 2-4  STM (passive release)  subscapularis    Therapeutic exercise (74731) x 25 min to address ROM/strength deficits  UBE (L1.2, fwd/back, 3/3) 6min  PROM  MWM  Scapular block  Abduction  Posterior glide  Flexion  Scaption  IR/ER  D2  Wall slide, 60s each  Flexion (B)  Scapular sequence,  [place] at head  Row  High (v light) 2x60s  Mid (med) 2min  Horizontal abduction (v light)2 x60s  Extension (med) 2min  Scaption (unloaded, halfround) 2x10    Patient Education on progress with ROM and resistance    ASSESSMENT     Patient demonstrates:  Preservation of functional mobility, with ER at C7 on presentation  Deficits in GHJ mobility as detailed above     She has improvements in:  tolerance of motion, achieving end range flexion in slide without pain  Load tolerance, progressing scapular sequencing increased resistance, duration, and movement complexity     Continues with:  Deficits in ROM, strength, and function of the UQ  Presentation is consistent with osteoarthritis with indications of labral pathology    PLAN     Continue therapy according to POC. Manual treatment for improved mobility and symptom modulation. Progress scapular stabilization with increased loading and dynamic motion. Introduce functional tasks as appropriate. Complete progress assessment at next visit. PLAN OF CARE     Effective Dates: 1/18/2023 TO 4/18/2023 (90 days). Frequency/Duration: 2x/week for 60 Day(s)  Interventions may include but are not limited to: (19678) Therapeutic exercise to develop ROM, strength, endurance and flexibility  (43555) Therapeutic activities using dynamic activities to improve function  (71384) Manual therapy techniques to improve joint and/or soft tissue mobility, ROM, and function as well as helping to decrease pain/spasms and swelling  (94754/62312) Dry needling for the management of neuromusculoskeletal pain and movement impairment  Home exercise program (HEP) development  Modalities prn to address pain, spasms, and swelling: (24212) Electrical stimulation - attended    GOALS     Short term goals to be met by 2/15/2023  (4 weeks):  Pt will demonstrate good recall of HEP requiring minimal verbal cuing for proper form and technique. Pt will wake </= 2 times/night to improve rest required for ADLs.     Pt. will be minimally tender with palpation to improve ability to lie/sleep on side. Pt to subjectively report </= 4/10 pain to allow for increased participation in functional movements. Demonstrate AROM of involved shoulder that is within 5-10 degrees of the uninjured UE and does not limit function. Long term goals to be met by 3/15/2023  (8 weeks):  Pt to subjectively report </= 2/10 pain with shoulder movements to allow for return to PLOF. MMT involved Shoulder to >/= 4+/5 allowing for normal lifting, reaching and pushing/pulling associated with ADLs. Pt will report return to previous level of function without c/o pain. Improve QuickDASH Score to </=  20% impairment, demonstrating improved overall function. [employer]  Access Code: [email or web address]  URL: [email or web address]/  Date: 02/06/2023  Prepared by: Gerhard Aguiar    Exercises  Seated Scapular Protraction and Retraction - 2 x daily - 2 sets - 20 reps - 2 hold  Supine Alternating Shoulder Flexion - 2 x daily - 2 sets - 20 reps - 2 hold  Sidelying Shoulder Abduction Palm Forward - 2 x daily - 2 sets - 20 reps  Standing 'L' Stretch at Counter - 2 x daily - 2 sets - 15 reps - 2 hold  Isometric Shoulder Flexion at Wall - 2 x daily - 20 reps - 5 hold  Isometric Shoulder Abduction at Wall - 2 x daily - 20 reps - 5 hold  Isometric Shoulder Extension at Wall - 2 x daily - 20 reps - 5 hold

## 2023-02-16 ENCOUNTER — OFFICE VISIT (OUTPATIENT)
Age: 58
End: 2023-02-16

## 2023-02-16 DIAGNOSIS — Z74.09 IMPAIRED MOBILITY AND ADLS: ICD-10-CM

## 2023-02-16 DIAGNOSIS — M19.012 OSTEOARTHRITIS OF GLENOHUMERAL JOINT, LEFT: ICD-10-CM

## 2023-02-16 DIAGNOSIS — R29.898 SHOULDER WEAKNESS: ICD-10-CM

## 2023-02-16 DIAGNOSIS — Z78.9 IMPAIRED MOBILITY AND ADLS: ICD-10-CM

## 2023-02-16 DIAGNOSIS — Z78.9 IMPAIRED MOTOR CONTROL: ICD-10-CM

## 2023-02-16 DIAGNOSIS — M25.612 DECREASED RANGE OF MOTION OF LEFT SHOULDER: ICD-10-CM

## 2023-02-16 DIAGNOSIS — Z74.09 DECREASED FUNCTIONAL MOBILITY AND ENDURANCE: ICD-10-CM

## 2023-02-16 DIAGNOSIS — M25.512 CHRONIC LEFT SHOULDER PAIN: Primary | ICD-10-CM

## 2023-02-16 DIAGNOSIS — G89.29 CHRONIC LEFT SHOULDER PAIN: Primary | ICD-10-CM

## 2023-02-16 NOTE — PROGRESS NOTES
[address]  [address] [facility] [address]  Dept: [phone]      Physical Therapy Daily Note     Referring MD: NICK Mazariegos - *  Diagnosis:     ICD-10-CM    1. Chronic left shoulder pain  M25.512     G89.29       2. Osteoarthritis of glenohumeral joint, left  M19.012       3. Impaired mobility and ADLs  Z74.09     Z78.9       4. Decreased functional mobility and endurance  Z74.09       5. Impaired motor control  Z78.9       6. Decreased range of motion of left shoulder  M25.612       7. Shoulder weakness  R29.898          Therapy precautions:None  Co-morbidities affecting plan of care: hx of pituitary adenoma, RTC repair (L, 2017)    PERTINENT MEDICAL HISTORY     Past medical and surgical history:   Past Medical History:   Diagnosis Date    Gastroesophageal reflux disease     Hiatal hernia     Hypertension     Irritable bowel syndrome     Obstructive sleep apnea     Osteopenia     Pituitary adenoma ([address]     Vitamin D deficiency       Past Surgical History:   Procedure Laterality Date    BREAST BIOPSY Right 2012    benign    HYSTERECTOMY (CERVIX STATUS UNKNOWN)  1997    ovaries intact    LAPAROSCOPY      ROTATOR CUFF REPAIR Left 2017    Ethan Felix 10    TUMOR REMOVAL  2008    TSPR (Dr. Arianna Jaramillo)    UROLOGICAL SURGERY      cystoscopy     Medications: reviewed in chart   Allergies: Allergies   Allergen Reactions    Aspirin      Other reaction(s): Wheezing and/or shortness of breathe-Allergy      Chief complaints/history of injury:   Date symptoms began: 01/2022  Kellie Daniels of condition: Chronic (continuous duration > 3 months)  Primary cause of current episode: Unspecified  How did symptoms start: patient reports no specific injury, only progressive onset of soreness and difficulty in motion  Describe current symptoms: soreness from [facility] to deltoid tuberosity    Received previous outpatient therapy?  No    Pain Assessment:  Pain location: L GHJ, from ACJ to deltoid tuberosity (diffuse)    Average Pain/symptom intensity (0-10 scale)  Last 24 hours: 7/10  Last week (1-7 days): 7/10  How often do you feel symptoms? Frequently (51-75%)  Description: aching, dull, and sharp with use  Aggravating factors: reaching, lifting, and carrying  Alleviating factors: heat and avoid aggravating movements    Neuro screen: denies numbness, tingling, and radiating pain    Patient Stated Goals: improve function    Initial Evaluation: 1/18/23  Last Progress Note: n/a  Total Visits: 7    Total Timed Codes: 40 min, Total Treatment Time: 40 min    SUBJECTIVE     Pt reports her shoulder is feeling a bit sore this morning, attributing it to the rainy weather. She continues to have increased usage in ADLs and self care. OBJECTIVE     Hand/Side Dominance: left handed  Observation:   Posture: Forward head, Rounded shoulders, and Scapular position: abducted and upwardly rotated  Swelling/Edema: none  Palpation/joint mobility: L GHJ: mild restriction (posterior, inferior),  hypertonic subscapularis (L, min with isolated trigger points)    A/PROM Measures:  Restrictions: Flexion (R mod) abduction (R mod) with mild scapular elevation    Functional  ER T4 C7        Treatment provided today:  Manual therapy (46162) x 15 min utilizing techniques to improve joint and/or soft tissue mobility, ROM, and function as well as helping to decrease pain/spasms and swelling.   Palpation and assessment of soft tissue, muscles, and landmarks   GHJ mobilization  Posterior (L) grade 2-4  Inferior (L) grade 2-4  STM (passive release)  subscapularis    Therapeutic exercise (13326) x 25 min to address ROM/strength deficits  UBE (L1.5, fwd/back, 3/3) 6min  PROM  MWM  Scapular block  Abduction  scaption  Posterior glide  Flexion  Scaption  IR/ER  Wall slide, 60s each  Flexion (1lb, B)  Scaption (B)  Scapular sequence,  [place] at head  Row  High (v light) 2min  Horizontal abduction (v light) 2min  Extension (med) 2min    Patient Education on progress with ROM and resistance    ASSESSMENT     Patient demonstrates:  Preservation of functional mobility, with ER at C7 on presentation  Deficits in GHJ mobility and associated muscular tone as detailed above     She has improvements in:  tolerance of motion, achieving end range flexion in slide without pain and progression to scaption  Load tolerance, progressing scapular sequencing increased duration     Continues with:  Deficits in ROM, strength, and function of the UQ  Presentation is consistent with osteoarthritis with indications of labral pathology    PLAN     Continue therapy according to POC. Manual treatment for improved mobility and symptom modulation. Progress scapular stabilization with increased loading and dynamic motion. Introduce functional tasks as appropriate. PLAN OF CARE     Effective Dates: 1/18/2023 TO 4/18/2023 (90 days). Frequency/Duration: 2x/week for 60 Day(s)  Interventions may include but are not limited to: (28980) Therapeutic exercise to develop ROM, strength, endurance and flexibility  (81696) Therapeutic activities using dynamic activities to improve function  (40785) Manual therapy techniques to improve joint and/or soft tissue mobility, ROM, and function as well as helping to decrease pain/spasms and swelling  (09175/38235) Dry needling for the management of neuromusculoskeletal pain and movement impairment  Home exercise program (HEP) development  Modalities prn to address pain, spasms, and swelling: (88612) Electrical stimulation - attended    GOALS     Short term goals to be met by 2/15/2023  (4 weeks):  Pt will demonstrate good recall of HEP requiring minimal verbal cuing for proper form and technique. Pt will wake </= 2 times/night to improve rest required for ADLs. Pt. will be minimally tender with palpation to improve ability to lie/sleep on side.   Pt to subjectively report </= 4/10 pain to allow for increased participation in functional movements. Demonstrate AROM of involved shoulder that is within 5-10 degrees of the uninjured UE and does not limit function. Long term goals to be met by 3/15/2023  (8 weeks):  Pt to subjectively report </= 2/10 pain with shoulder movements to allow for return to PLOF. MMT involved Shoulder to >/= 4+/5 allowing for normal lifting, reaching and pushing/pulling associated with ADLs. Pt will report return to previous level of function without c/o pain. Improve QuickDASH Score to </=  20% impairment, demonstrating improved overall function. [employer]  Access Code: [email or web address]  URL: [email or web address]  Date: 02/06/2023  Prepared by: Catherine Plummer    Exercises  Seated Scapular Protraction and Retraction - 2 x daily - 2 sets - 20 reps - 2 hold  Supine Alternating Shoulder Flexion - 2 x daily - 2 sets - 20 reps - 2 hold  Sidelying Shoulder Abduction Palm Forward - 2 x daily - 2 sets - 20 reps  Standing 'L' Stretch at Counter - 2 x daily - 2 sets - 15 reps - 2 hold  Isometric Shoulder Flexion at Wall - 2 x daily - 20 reps - 5 hold  Isometric Shoulder Abduction at Wall - 2 x daily - 20 reps - 5 hold  Isometric Shoulder Extension at Wall - 2 x daily - 20 reps - 5 hold

## 2023-02-20 ENCOUNTER — OFFICE VISIT (OUTPATIENT)
Age: 58
End: 2023-02-20
Payer: MEDICARE

## 2023-02-20 DIAGNOSIS — Z74.09 IMPAIRED MOBILITY AND ADLS: ICD-10-CM

## 2023-02-20 DIAGNOSIS — Z78.9 IMPAIRED MOBILITY AND ADLS: ICD-10-CM

## 2023-02-20 DIAGNOSIS — M19.012 OSTEOARTHRITIS OF GLENOHUMERAL JOINT, LEFT: ICD-10-CM

## 2023-02-20 DIAGNOSIS — M25.512 CHRONIC LEFT SHOULDER PAIN: Primary | ICD-10-CM

## 2023-02-20 DIAGNOSIS — R29.898 SHOULDER WEAKNESS: ICD-10-CM

## 2023-02-20 DIAGNOSIS — G89.29 CHRONIC LEFT SHOULDER PAIN: Primary | ICD-10-CM

## 2023-02-20 DIAGNOSIS — M25.612 DECREASED RANGE OF MOTION OF LEFT SHOULDER: ICD-10-CM

## 2023-02-20 DIAGNOSIS — Z78.9 IMPAIRED MOTOR CONTROL: ICD-10-CM

## 2023-02-20 DIAGNOSIS — Z74.09 DECREASED FUNCTIONAL MOBILITY AND ENDURANCE: ICD-10-CM

## 2023-02-20 PROCEDURE — 97140 MANUAL THERAPY 1/> REGIONS: CPT

## 2023-02-20 PROCEDURE — 97110 THERAPEUTIC EXERCISES: CPT

## 2023-02-20 NOTE — PROGRESS NOTES
[address]  [address] [address]  Dept: [phone]      Physical Therapy Daily Note     Referring MD: NICK Cardona - *  Diagnosis:     ICD-10-CM    1. Chronic left shoulder pain  M25.512     G89.29       2. Osteoarthritis of glenohumeral joint, left  M19.012       3. Impaired mobility and ADLs  Z74.09     Z78.9       4. Decreased functional mobility and endurance  Z74.09       5. Impaired motor control  Z78.9       6. Decreased range of motion of left shoulder  M25.612       7. Shoulder weakness  R29.898          Therapy precautions:None  Co-morbidities affecting plan of care: hx of pituitary adenoma, RTC repair (L, 2017)    PERTINENT MEDICAL HISTORY     Past medical and surgical history:   Past Medical History:   Diagnosis Date    Gastroesophageal reflux disease     Hiatal hernia     Hypertension     Irritable bowel syndrome     Obstructive sleep apnea     Osteopenia     Pituitary adenoma ([address]     Vitamin D deficiency       Past Surgical History:   Procedure Laterality Date    BREAST BIOPSY Right 2012    benign    HYSTERECTOMY (CERVIX STATUS UNKNOWN)  1997    ovaries intact    LAPAROSCOPY      ROTATOR CUFF REPAIR Left 2017    Ethan Felix [address]    TUMOR REMOVAL  2008    TSPR (Dr. Titus Luis)    UROLOGICAL SURGERY      cystoscopy     Medications: reviewed in chart   Allergies: Allergies   Allergen Reactions    Aspirin      Other reaction(s): Wheezing and/or shortness of breathe-Allergy      Chief complaints/history of injury:   Date symptoms began: 01/2022  Bharat Heart of condition: Chronic (continuous duration > 3 months)  Primary cause of current episode: Unspecified  How did symptoms start: patient reports no specific injury, only progressive onset of soreness and difficulty in motion  Describe current symptoms: soreness from [facility] to deltoid tuberosity    Received previous outpatient therapy?  No    Pain Assessment:  Pain location: L GHJ, from ACJ to deltoid tuberosity (diffuse)    Average Pain/symptom intensity (0-10 scale)  Last 24 hours: 7/10  Last week (1-7 days): 7/10  How often do you feel symptoms? Frequently (51-75%)  Description: aching, dull, and sharp with use  Aggravating factors: reaching, lifting, and carrying  Alleviating factors: heat and avoid aggravating movements    Neuro screen: denies numbness, tingling, and radiating pain    Patient Stated Goals: improve function    Initial Evaluation: 1/18/23  Last Progress Note: n/a  Total Visits: 8/12    Total Timed Codes: 40 min, Total Treatment Time: 40 min    SUBJECTIVE     Pt reports her shoulder feels better this morning, and that the only place of soreness is her knee. She thinks her function is much better, and reports that overall it feels like the LUE is lighter, as if a weight has been lifted from it. OBJECTIVE     Hand/Side Dominance: left handed  Observation:   Posture: Forward head, Rounded shoulders, and Scapular position: abducted and upwardly rotated  Swelling/Edema: none  Palpation/joint mobility: L GHJ: mild restriction (posterior, inferior),  hypertonic subscapularis (L, min with isolated trigger points)    A/PROM Measures:  Restrictions: Flexion (R mod) abduction (R mod) with mild scapular elevation    Functional  ER T4 T1        Treatment provided today:  Manual therapy (27063) x 15 min utilizing techniques to improve joint and/or soft tissue mobility, ROM, and function as well as helping to decrease pain/spasms and swelling.   Palpation and assessment of soft tissue, muscles, and landmarks   GHJ mobilization  Posterior (L) grade 2-4  Inferior (L) grade 2-4  STM (passive release)  subscapularis    Therapeutic exercise (13202) x 25 min to address ROM/strength deficits  UBE (L1.5, fwd/back, 3/3) 6min  PROM  MWM  Scapular block  Abduction  scaption  Posterior glide  Flexion  Scaption  IR/ER  Scapular sequence,  [place] at head  Row  High (light) 2min  Horizontal abduction (v light) 2min  Extension (med, alternating isometric) 2min  [place] at knees  ER (W's, light band) 2min  Carry[place]armers (BUE, 8lbs) [id number] circles (60s each)Posterior humeral glide (90 flexion, red playground ball)Flexion/extensionHorizontal add/abduction  Patient Education on progress with loading and introduction to functional motions    ASSESSMENT     Patient demonstrates:  advancing functional mobility, with ER at T1 on presentation  Deficits in GHJ mobility and associated muscular tone as detailed above     She has improvements in:  tolerance of motion, achieving end range motions passively without pain or 'pinching' sensation  Load tolerance, progressing scapular sequencing with increased resistance and duration, along with introduction to functional loading     Continues with:  Deficits in ROM, strength, and function of the UQ  Presentation is consistent with osteoarthritis with indications of labral pathology    PLAN     Continue therapy according to POC. Manual treatment for improved mobility and symptom modulation. Progress scapular stabilization with increased loading and dynamic motion. Introduce functional tasks as appropriate. PLAN OF CARE     Effective Dates: 1/18/2023 TO 4/18/2023 (90 days).     Frequency/Duration: 2x/week for 60 Day(s)  Interventions may include but are not limited to: (01611) Therapeutic exercise to develop ROM, strength, endurance and flexibility  (64898) Therapeutic activities using dynamic activities to improve function  (68220) Manual therapy techniques to improve joint and/or soft tissue mobility, ROM, and function as well as helping to decrease pain/spasms and swelling  (92063/95563) Dry needling for the management of neuromusculoskeletal pain and movement impairment  Home exercise program (HEP) development  Modalities prn to address pain, spasms, and swelling: (93786) Electrical stimulation - attended    GOALS     Short term goals to be met by 2/15/2023  (4 weeks):  Pt will demonstrate good recall of HEP requiring minimal verbal cuing for proper form and technique. Pt will wake </= 2 times/night to improve rest required for ADLs. Pt. will be minimally tender with palpation to improve ability to lie/sleep on side. Pt to subjectively report </= 4/10 pain to allow for increased participation in functional movements. Demonstrate AROM of involved shoulder that is within 5-10 degrees of the uninjured UE and does not limit function. Long term goals to be met by 3/15/2023  (8 weeks):  Pt to subjectively report </= 2/10 pain with shoulder movements to allow for return to PLOF. MMT involved Shoulder to >/= 4+/5 allowing for normal lifting, reaching and pushing/pulling associated with ADLs. Pt will report return to previous level of function without c/o pain. Improve QuickDASH Score to </=  20% impairment, demonstrating improved overall function. [employer]  Access Code: [email or web address]  URL: [email or web address]  Date: 02/06/2023  Prepared by: Lizzy Blanchard    Exercises  Seated Scapular Protraction and Retraction - 2 x daily - 2 sets - 20 reps - 2 hold  Supine Alternating Shoulder Flexion - 2 x daily - 2 sets - 20 reps - 2 hold  Sidelying Shoulder Abduction Palm Forward - 2 x daily - 2 sets - 20 reps  Standing 'L' Stretch at Counter - 2 x daily - 2 sets - 15 reps - 2 hold  Isometric Shoulder Flexion at Wall - 2 x daily - 20 reps - 5 hold  Isometric Shoulder Abduction at Wall - 2 x daily - 20 reps - 5 hold  Isometric Shoulder Extension at Wall - 2 x daily - 20 reps - 5 hold

## 2023-02-23 ENCOUNTER — OFFICE VISIT (OUTPATIENT)
Age: 58
End: 2023-02-23

## 2023-02-23 DIAGNOSIS — M19.012 OSTEOARTHRITIS OF GLENOHUMERAL JOINT, LEFT: ICD-10-CM

## 2023-02-23 DIAGNOSIS — M25.512 CHRONIC LEFT SHOULDER PAIN: Primary | ICD-10-CM

## 2023-02-23 DIAGNOSIS — Z78.9 IMPAIRED MOBILITY AND ADLS: ICD-10-CM

## 2023-02-23 DIAGNOSIS — G89.29 CHRONIC LEFT SHOULDER PAIN: Primary | ICD-10-CM

## 2023-02-23 DIAGNOSIS — Z74.09 DECREASED FUNCTIONAL MOBILITY AND ENDURANCE: ICD-10-CM

## 2023-02-23 DIAGNOSIS — Z78.9 IMPAIRED MOTOR CONTROL: ICD-10-CM

## 2023-02-23 DIAGNOSIS — M25.612 DECREASED RANGE OF MOTION OF LEFT SHOULDER: ICD-10-CM

## 2023-02-23 DIAGNOSIS — Z74.09 IMPAIRED MOBILITY AND ADLS: ICD-10-CM

## 2023-02-23 DIAGNOSIS — R29.898 SHOULDER WEAKNESS: ICD-10-CM

## 2023-02-23 NOTE — PROGRESS NOTES
[address]  [address] [facility]  [address] [address]  Dept: [phone]      Physical Therapy Daily Note     Referring MD: NICK Langley - *  Diagnosis:     ICD-10-CM    1. Chronic left shoulder pain  M25.512     G89.29       2. Osteoarthritis of glenohumeral joint, left  M19.012       3. Impaired mobility and ADLs  Z74.09     Z78.9       4. Decreased functional mobility and endurance  Z74.09       5. Impaired motor control  Z78.9       6. Decreased range of motion of left shoulder  M25.612       7. Shoulder weakness  R29.898          Therapy precautions:None  Co-morbidities affecting plan of care: hx of pituitary adenoma, RTC repair (L, 2017)    PERTINENT MEDICAL HISTORY     Past medical and surgical history:   Past Medical History:   Diagnosis Date    Gastroesophageal reflux disease     Hiatal hernia     Hypertension     Irritable bowel syndrome     Obstructive sleep apnea     Osteopenia     Pituitary adenoma ([address]     Vitamin D deficiency       Past Surgical History:   Procedure Laterality Date    BREAST BIOPSY Right 2012    benign    HYSTERECTOMY (CERVIX STATUS UNKNOWN)  1997    ovaries intact    LAPAROSCOPY      ROTATOR CUFF REPAIR Left 2017    Ethan Felix 10    TUMOR REMOVAL  2008    TSPR (Dr. Lena Fajardo)    UROLOGICAL SURGERY      cystoscopy     Medications: reviewed in chart   Allergies: Allergies   Allergen Reactions    Aspirin      Other reaction(s): Wheezing and/or shortness of breathe-Allergy      Chief complaints/history of injury:   Date symptoms began: 01/2022  Claudetta Standing of condition: Chronic (continuous duration > 3 months)  Primary cause of current episode: Unspecified  How did symptoms start: patient reports no specific injury, only progressive onset of soreness and difficulty in motion  Describe current symptoms: soreness from [facility] to deltoid tuberosity    Received previous outpatient therapy?  No    Pain Assessment:  Pain location: L GHJ, from ACJ to deltoid tuberosity (diffuse)    Average Pain/symptom intensity (0-10 scale)  Last 24 hours: 7/10  Last week (1-7 days): 7/10  How often do you feel symptoms? Frequently (51-75%)  Description: aching, dull, and sharp with use  Aggravating factors: reaching, lifting, and carrying  Alleviating factors: heat and avoid aggravating movements    Neuro screen: denies numbness, tingling, and radiating pain    Patient Stated Goals: improve function    Initial Evaluation: 1/18/23  Last Progress Note: n/a  Total Visits: 9/12    Total Timed Codes: 40 min, Total Treatment Time: 40 min    SUBJECTIVE     Pt reports her shoulder feels pretty good this morning, she continues to use it more, and is happy to demonstrate how she can perform a scaption wall slide with minimal difficulty. OBJECTIVE     Hand/Side Dominance: left handed  Observation:   Posture: Forward head, Rounded shoulders, and Scapular position: abducted and upwardly rotated  Swelling/Edema: none  Palpation/joint mobility: L GHJ: mild restriction (posterior, inferior),  hypertonic subscapularis (L, min with isolated trigger points)    A/PROM Measures:  Restrictions: Flexion (B min) abduction (R min, L mod) with mild L scapular elevation    Functional  ER T4 T2    Functional IR T12 L2     Treatment provided today:  Manual therapy (93182) x 15 min utilizing techniques to improve joint and/or soft tissue mobility, ROM, and function as well as helping to decrease pain/spasms and swelling.   Palpation and assessment of soft tissue, muscles, and landmarks   GHJ mobilization  Posterior (L) grade 2-4  STM (passive release)  subscapularis    Therapeutic exercise (51787) x 25 min to address ROM/strength deficits  UBE (L1.8, fwd/back, 3/3) 6min  PROM  MWM  Scapular block  Abduction  scaption  Posterior glide  Flexion  Scaption  Scapular sequence (2x60s each)  [place] at head  Row  High (med)    Horizontal abduction (light)  Extension (med, alternating isometric)  Scaption (1lb, halfround) 2x15  [place] (unloaded) 2x8    Patient Education on progress with motion and loading, reduction of POC to 1x/week to allow for increased self management    ASSESSMENT     Patient demonstrates:  advancing functional mobility, with ER at T2 and tolerance of functional IR  on presentation  Deficits in GHJ mobility and associated muscular tone as detailed above     She has improvements in:  AROM s/p manual with reduction in elevation compensation  Load tolerance, progressing scapular sequencing with increased resistance, along with iprogression of OH motions     Continues with:  Deficits in ROM, strength, and function of the UQ  Presentation is consistent with osteoarthritis with indications of labral pathology    PLAN     Reduce POC to 1x/week. Manual treatment for improved mobility and symptom modulation. Progress scapular stabilization with increased loading and dynamic motion. Introduce functional tasks as appropriate. PLAN OF CARE     Effective Dates: 1/18/2023 TO 4/18/2023 (90 days). Frequency/Duration: 2x/week for 60 Day(s)  Interventions may include but are not limited to: (47248) Therapeutic exercise to develop ROM, strength, endurance and flexibility  (45704) Therapeutic activities using dynamic activities to improve function  (16267) Manual therapy techniques to improve joint and/or soft tissue mobility, ROM, and function as well as helping to decrease pain/spasms and swelling  (22582/71722) Dry needling for the management of neuromusculoskeletal pain and movement impairment  Home exercise program (HEP) development  Modalities prn to address pain, spasms, and swelling: (01256) Electrical stimulation - attended    GOALS     Short term goals to be met by 2/15/2023  (4 weeks):  Pt will demonstrate good recall of HEP requiring minimal verbal cuing for proper form and technique. Goal Met 2/23/2023  Pt will wake </= 2 times/night to improve rest required for ADLs.  Goal Met 2/23/2023  Pt. will be minimally tender with palpation to improve ability to lie/sleep on side. Goal Met 2/23/2023  Pt to subjectively report </= 4/10 pain to allow for increased participation in functional movements. Goal Deferred 2/23/2023 - Continue  Demonstrate AROM of involved shoulder that is within 5-10 degrees of the uninjured UE and does not limit function. Goal Deferred 2/23/2023 - Continue    Long term goals to be met by 3/15/2023  (8 weeks):  Pt to subjectively report </= 2/10 pain with shoulder movements to allow for return to PLOF.  MMT involved Shoulder to >/= 4+/5 allowing for normal lifting, reaching and pushing/pulling associated with ADLs.   Pt will report return to previous level of function without c/o pain.  Improve QuickDASH Score to </=  20% impairment, demonstrating improved overall function.    [employer]  Access Code: [email or web address]  URL: [email or web address]  Date: 02/06/2023  Prepared by: Madhav Valera    Exercises  Seated Scapular Protraction and Retraction - 2 x daily - 2 sets - 20 reps - 2 hold  Supine Alternating Shoulder Flexion - 2 x daily - 2 sets - 20 reps - 2 hold  Sidelying Shoulder Abduction Palm Forward - 2 x daily - 2 sets - 20 reps  Standing 'L' Stretch at Counter - 2 x daily - 2 sets - 15 reps - 2 hold  Isometric Shoulder Flexion at Wall - 2 x daily - 20 reps - 5 hold  Isometric Shoulder Abduction at Wall - 2 x daily - 20 reps - 5 hold  Isometric Shoulder Extension at Wall - 2 x daily - 20 reps - 5 hold

## 2023-02-27 ENCOUNTER — OFFICE VISIT (OUTPATIENT)
Age: 58
End: 2023-02-27
Payer: MEDICARE

## 2023-02-27 DIAGNOSIS — R29.898 SHOULDER WEAKNESS: ICD-10-CM

## 2023-02-27 DIAGNOSIS — G89.29 CHRONIC LEFT SHOULDER PAIN: Primary | ICD-10-CM

## 2023-02-27 DIAGNOSIS — M25.612 DECREASED RANGE OF MOTION OF LEFT SHOULDER: ICD-10-CM

## 2023-02-27 DIAGNOSIS — M25.512 CHRONIC LEFT SHOULDER PAIN: Primary | ICD-10-CM

## 2023-02-27 DIAGNOSIS — Z78.9 IMPAIRED MOTOR CONTROL: ICD-10-CM

## 2023-02-27 DIAGNOSIS — Z78.9 IMPAIRED MOBILITY AND ADLS: ICD-10-CM

## 2023-02-27 DIAGNOSIS — Z74.09 IMPAIRED MOBILITY AND ADLS: ICD-10-CM

## 2023-02-27 DIAGNOSIS — Z74.09 DECREASED FUNCTIONAL MOBILITY AND ENDURANCE: ICD-10-CM

## 2023-02-27 DIAGNOSIS — M19.012 OSTEOARTHRITIS OF GLENOHUMERAL JOINT, LEFT: ICD-10-CM

## 2023-02-27 PROCEDURE — 97110 THERAPEUTIC EXERCISES: CPT

## 2023-02-27 PROCEDURE — 97140 MANUAL THERAPY 1/> REGIONS: CPT

## 2023-02-27 NOTE — PROGRESS NOTES
[address]  [address] [address]  Dept: [phone]      Physical Therapy Daily Note     Referring MD: NICK Romo - *  Diagnosis:     ICD-10-CM    1. Chronic left shoulder pain  M25.512     G89.29       2. Osteoarthritis of glenohumeral joint, left  M19.012       3. Impaired mobility and ADLs  Z74.09     Z78.9       4. Decreased functional mobility and endurance  Z74.09       5. Impaired motor control  Z78.9       6. Decreased range of motion of left shoulder  M25.612       7. Shoulder weakness  R29.898          Therapy precautions:None  Co-morbidities affecting plan of care: hx of pituitary adenoma, RTC repair (L, 2017)    PERTINENT MEDICAL HISTORY     Past medical and surgical history:   Past Medical History:   Diagnosis Date    Gastroesophageal reflux disease     Hiatal hernia     Hypertension     Irritable bowel syndrome     Obstructive sleep apnea     Osteopenia     Pituitary adenoma ([address]     Vitamin D deficiency       Past Surgical History:   Procedure Laterality Date    BREAST BIOPSY Right 2012    benign    HYSTERECTOMY (CERVIX STATUS UNKNOWN)  1997    ovaries intact    LAPAROSCOPY      ROTATOR CUFF REPAIR Left 2017    Ethan Felix 10    TUMOR REMOVAL  2008    TSPR (Dr. Artur Forrester)    UROLOGICAL SURGERY      cystoscopy     Medications: reviewed in chart   Allergies: Allergies   Allergen Reactions    Aspirin      Other reaction(s): Wheezing and/or shortness of breathe-Allergy      Chief complaints/history of injury:   Date symptoms began: 01/2022  Praneeth Dailey of condition: Chronic (continuous duration > 3 months)  Primary cause of current episode: Unspecified  How did symptoms start: patient reports no specific injury, only progressive onset of soreness and difficulty in motion  Describe current symptoms: soreness from [facility] to deltoid tuberosity    Received previous outpatient therapy?  No    Pain Assessment:  Pain location: L GHJ, from ACJ to deltoid tuberosity (diffuse)    Average Pain/symptom intensity (0-10 scale)  Last 24 hours: 7/10  Last week (1-7 days): 7/10  How often do you feel symptoms? Frequently (51-75%)  Description: aching, dull, and sharp with use  Aggravating factors: reaching, lifting, and carrying  Alleviating factors: heat and avoid aggravating movements    Neuro screen: denies numbness, tingling, and radiating pain    Patient Stated Goals: improve function    Initial Evaluation: 1/18/23  Last Progress Note: n/a  Total Visits: 10/12    Total Timed Codes: 40 min, Total Treatment Time: 40 min    SUBJECTIVE     Pt reports being a little sore deep in the L GHJ, but notes she has been utilizing her LUE more, including carrying her purse on that side. OBJECTIVE     Hand/Side Dominance: left handed  Observation:   Posture: Forward head, Rounded shoulders, and Scapular position: abducted and upwardly rotated  Swelling/Edema: none  Palpation/joint mobility: L GHJ: mild restriction (posterior, inferior),  hypertonic subscapularis (L, min with isolated trigger points)    A/PROM Measures:  Restrictions: Flexion (B min) abduction (R min, L mod) with mild L scapular elevation           Treatment provided today:  Manual therapy (04278) x 15 min utilizing techniques to improve joint and/or soft tissue mobility, ROM, and function as well as helping to decrease pain/spasms and swelling.   Palpation and assessment of soft tissue, muscles, and landmarks   GHJ mobilization  Posterior (L) grade 2-4  STM (passive release)  subscapularis    Therapeutic exercise (43270) x 25 min to address ROM/strength deficits  UBE (L2.0, fwd/back, 3/3) 6min  PROM  MWM  Scapular block  Abduction  Scaption  flexion  Scapular sequence  [place] at head, 2min each  Row  High (med)    Horizontal abduction (light)  Extension (med, )  [place] at knees  Scaption (v light band) 2x10  Plank  Wall  Alternating hand taps, 2x60s  Sup/inf walk (1 step) 2x60s    ASSESSMENT     Patient demonstrates:  advancing function and loading of LUE in ADLs  Deficits in GHJ mobility and associated muscular tone as detailed above     She has improvements in:  AROM s/p manual with increased abduction and reduction in elevation compensation during flexion  Load tolerance, progressing scapular sequencing with increased duration, along with progression of anterior loading     Continues with:  Deficits in ROM, strength, and function of the UQ  Presentation is consistent with osteoarthritis with indications of labral pathology    PLAN     Continue therapy according to POC. Manual treatment for improved mobility and symptom modulation. Progress scapular stabilization with increased loading and dynamic motion. Introduce functional tasks as appropriate. Complete progress assessment at next visit. PLAN OF CARE     Effective Dates: 1/18/2023 TO 4/18/2023 (90 days). Frequency/Duration: 1x/week for 60 Day(s)  Interventions may include but are not limited to: (98073) Therapeutic exercise to develop ROM, strength, endurance and flexibility  (17705) Therapeutic activities using dynamic activities to improve function  (93578) Manual therapy techniques to improve joint and/or soft tissue mobility, ROM, and function as well as helping to decrease pain/spasms and swelling  (17423/30558) Dry needling for the management of neuromusculoskeletal pain and movement impairment  Home exercise program (HEP) development  Modalities prn to address pain, spasms, and swelling: (38709) Electrical stimulation - attended    GOALS     Short term goals to be met by 2/15/2023  (4 weeks):  Pt will demonstrate good recall of HEP requiring minimal verbal cuing for proper form and technique. Goal Met 2/23/2023  Pt will wake </= 2 times/night to improve rest required for ADLs. Goal Met 2/23/2023  Pt. will be minimally tender with palpation to improve ability to lie/sleep on side.  Goal Met 2/23/2023  Pt to subjectively report </= 4/10 pain to allow for increased participation in functional movements. Goal Deferred 2/23/2023 - Continue  Demonstrate AROM of involved shoulder that is within 5-10 degrees of the uninjured UE and does not limit function. Goal Deferred 2/23/2023 - Continue    Long term goals to be met by 3/15/2023  (8 weeks):  Pt to subjectively report </= 2/10 pain with shoulder movements to allow for return to PLOF. MMT involved Shoulder to >/= 4+/5 allowing for normal lifting, reaching and pushing/pulling associated with ADLs. Pt will report return to previous level of function without c/o pain. Improve QuickDASH Score to </=  20% impairment, demonstrating improved overall function. [employer]  Access Code: [email or web address]  URL: [email or web address]  Date: 02/06/2023  Prepared by: Jamie Link    Exercises  Seated Scapular Protraction and Retraction - 2 x daily - 2 sets - 20 reps - 2 hold  Supine Alternating Shoulder Flexion - 2 x daily - 2 sets - 20 reps - 2 hold  Sidelying Shoulder Abduction Palm Forward - 2 x daily - 2 sets - 20 reps  Standing 'L' Stretch at Counter - 2 x daily - 2 sets - 15 reps - 2 hold  Isometric Shoulder Flexion at Wall - 2 x daily - 20 reps - 5 hold  Isometric Shoulder Abduction at Wall - 2 x daily - 20 reps - 5 hold  Isometric Shoulder Extension at Wall - 2 x daily - 20 reps - 5 hold

## 2023-03-06 ENCOUNTER — OFFICE VISIT (OUTPATIENT)
Age: 58
End: 2023-03-06
Payer: MEDICARE

## 2023-03-06 DIAGNOSIS — Z78.9 IMPAIRED MOTOR CONTROL: ICD-10-CM

## 2023-03-06 DIAGNOSIS — R29.898 SHOULDER WEAKNESS: ICD-10-CM

## 2023-03-06 DIAGNOSIS — M19.012 OSTEOARTHRITIS OF GLENOHUMERAL JOINT, LEFT: ICD-10-CM

## 2023-03-06 DIAGNOSIS — Z78.9 IMPAIRED MOBILITY AND ADLS: ICD-10-CM

## 2023-03-06 DIAGNOSIS — Z74.09 IMPAIRED MOBILITY AND ADLS: ICD-10-CM

## 2023-03-06 DIAGNOSIS — M25.612 DECREASED RANGE OF MOTION OF LEFT SHOULDER: ICD-10-CM

## 2023-03-06 DIAGNOSIS — M25.512 CHRONIC LEFT SHOULDER PAIN: Primary | ICD-10-CM

## 2023-03-06 DIAGNOSIS — Z74.09 DECREASED FUNCTIONAL MOBILITY AND ENDURANCE: ICD-10-CM

## 2023-03-06 DIAGNOSIS — G89.29 CHRONIC LEFT SHOULDER PAIN: Primary | ICD-10-CM

## 2023-03-06 PROCEDURE — 97140 MANUAL THERAPY 1/> REGIONS: CPT

## 2023-03-06 PROCEDURE — 97110 THERAPEUTIC EXERCISES: CPT

## 2023-03-06 NOTE — PROGRESS NOTES
[address] [facility]  [address] [facility] [address]  Dept: [phone]      Physical Therapy Progress Report     Referring MD: NICK Steele - *  Diagnosis:     ICD-10-CM    1. Chronic left shoulder pain  M25.512     G89.29       2. Osteoarthritis of glenohumeral joint, left  M19.012       3. Impaired mobility and ADLs  Z74.09     Z78.9       4. Decreased functional mobility and endurance  Z74.09       5. Impaired motor control  Z78.9       6. Decreased range of motion of left shoulder  M25.612       7. Shoulder weakness  R29.898          Therapy precautions:None  Co-morbidities affecting plan of care: hx of pituitary adenoma, RTC repair (L, 2017)    PERTINENT MEDICAL HISTORY     Past medical and surgical history:   Past Medical History:   Diagnosis Date    Gastroesophageal reflux disease     Hiatal hernia     Hypertension     Irritable bowel syndrome     Obstructive sleep apnea     Osteopenia     Pituitary adenoma ([address]     Vitamin D deficiency       Past Surgical History:   Procedure Laterality Date    BREAST BIOPSY Right 2012    benign    HYSTERECTOMY (CERVIX STATUS UNKNOWN)  1997    ovaries intact    LAPAROSCOPY      ROTATOR CUFF REPAIR Left 2017    Ethan Felix 10    TUMOR REMOVAL  2008    TSPR (Dr. Radha Alicea)    UROLOGICAL SURGERY      cystoscopy     Medications: reviewed in chart   Allergies:    Allergies   Allergen Reactions    Aspirin      Other reaction(s): Wheezing and/or shortness of breathe-Allergy      Chief complaints/history of injury:   How did symptoms start: patient reports no specific injury, only progressive onset of soreness and difficulty in motion  Describe current symptoms: soreness from [facility] to deltoid tuberosity    Neuro screen: denies numbness, tingling, and radiating pain    Patient Stated Goals: improve function    Initial Evaluation: 1/18/23  Last Progress Note: n/a  Total Visits: 11/12    Total Timed Codes: 40 min, Total Treatment Time: 40 min    SUBJECTIVE     Pt reports feeling great on arrival; she wishes her knee would catch up to how her shoulder feels. She is happy to show how much motion she has without pain. Nature of condition: Chronic (continuous duration > 3 months)  Describe current symptoms: mild soreness on top of the shoulder with OH and motions    Pain Assessment:  Pain location: superior aspect of GHJ    Average Pain/symptom intensity (0-10 scale)  Last 24 hours: 1/10  Last week (1-7 days): 1/10  How often do you feel symptoms? Intermittently (0-25%)    How much have your symptoms interfered with daily activities? Not at all  How has your condition changed since receiving care at this facility? Much better  In general, would you say your current overall health is fair     Functional Outcome Measures: QuickDASH:  29.5/100= 29.5% functional deficit      OBJECTIVE     Hand/Side Dominance: left handed  Observation:   Posture: Forward head, Rounded shoulders, and Scapular position: abducted and upwardly rotated  Swelling/Edema: none  Palpation/joint mobility: L GHJ: mild restriction (posterior, inferior),  hypertonic subscapularis (L, max with isolated trigger points)    A/PROM Measures:    Shoulder (Supine) Right Left Comment (end feel)   Flexion 158 132 154 s/p manual   Abduction 158 157    Scaption 150 120 151 s/p manual   IR  53 53 PROM at 45° abd   ER 66 68 PROM at 45° abd   Functional  ER T3 T2    Functional IR T12 L1    Elbow WNL WNL      Strength/MMT (0-5 Scale): Shoulder Right Left Comment   Flexion 5 4*     Abduction 5 4     Scaption 5 4+*     IR  5 5     ER 5 5     Elbow         Flexion 5 5     Extension 5 4+       Special Tests/Function  Labrum: Biceps Load: -  Crank: -    Treatment provided today:  Manual therapy (96069) x 15 min utilizing techniques to improve joint and/or soft tissue mobility, ROM, and function as well as helping to decrease pain/spasms and swelling.   Palpation and assessment of soft tissue, muscles, and landmarks   GHJ mobilization  Posterior (L) grade 2-4  STM (passive release)  subscapularis    Therapeutic exercise (39181) x 25 min to address ROM/strength deficits  UBE (L2.2, fwd/back, 3/3) 6min  MMT and ROM assessment  PROM  MWM  Scapular block  Abduction  Scaption  flexion  [place] stretch (active release)  ASSESSMENT     Progress Report Period: 1/18 to 3/6/2023  As of 3/6/2023, Isma  has attended 11 PT sessions. Pt's attendance has been consistent with plan of care. Pt has progressed well with treatment. She has met 5/5 short term goals, has subjective reports of improved LUE function in self care and ADL tasks, and has improved functional deficit to 29.5% per QuickDASH. Objective findings revealed improving strength and ROM globally, with continued difficulty in GHJ mobility and scapular motor control. Continued deficits include: Pain  ROM limitations  Strength deficits  Motor control deficits  Decreased endurance/activity Tolerance. Pt demonstrates mild or moderate objective and functional deficits without instability: sporadic symptoms with min to mod loss of ROM, strength, or ADLs. Pt has not achieved max rehab potential and would benefit from continued skilled PT to address the above impairments and continue progress towards remaining therapy goals. PLAN     Continue therapy according to POC. Manual treatment for improved mobility and symptom modulation. Progress scapular stabilization with increased loading and dynamic motion. Introduce functional tasks as appropriate. PLAN OF CARE     Effective Dates: 1/18/2023 TO 4/18/2023 (90 days).     Frequency/Duration: 1x/week for 60 Day(s)  Interventions may include but are not limited to: (76029) Therapeutic exercise to develop ROM, strength, endurance and flexibility  (32980) Therapeutic activities using dynamic activities to improve function  (98189) Manual therapy techniques to improve joint and/or soft tissue mobility, ROM, and function as well as helping to decrease pain/spasms and swelling  (64860/74975) Dry needling for the management of neuromusculoskeletal pain and movement impairment  Home exercise program (HEP) development  Modalities prn to address pain, spasms, and swelling: (65377) Electrical stimulation - attended    GOALS     Short term goals to be met by 2/15/2023  (4 weeks):  Pt will demonstrate good recall of HEP requiring minimal verbal cuing for proper form and technique. Goal Met 2/23/2023  Pt will wake </= 2 times/night to improve rest required for ADLs. Goal Met 2/23/2023  Pt. will be minimally tender with palpation to improve ability to lie/sleep on side. Goal Met 2/23/2023  Pt to subjectively report </= 4/10 pain to allow for increased participation in functional movements. Goal Met 3/6/2023  Demonstrate AROM of involved shoulder that is within 5-10 degrees of the uninjured UE and does not limit function. Goal Met 3/6/2023    Long term goals to be met by 3/15/2023  (8 weeks):  Pt to subjectively report </= 2/10 pain with shoulder movements to allow for return to PLOF. Goal Met 3/6/2023  MMT involved Shoulder to >/= 4+/5 allowing for normal lifting, reaching and pushing/pulling associated with ADLs. Goal Not Met 3/6/2023 - Continue  Pt will report return to previous level of function without c/o pain. Goal Not Met 3/6/2023 - Continue  Improve QuickDASH Score to </=  20% impairment, demonstrating improved overall function. Goal Not Met 3/6/2023 - Continue    [email or web address]  Access Code: [email or web address]  URL: [email or web address]  Date: 02/06/2023  Prepared by: Jyothi Call    Exercises  Seated Scapular Protraction and Retraction - 2 x daily - 2 sets - 20 reps - 2 hold  Supine Alternating Shoulder Flexion - 2 x daily - 2 sets - 20 reps - 2 hold  Sidelying Shoulder Abduction Palm Forward - 2 x daily - 2 sets - 20 reps  Standing 'L' Stretch at Counter - 2 x daily - 2 sets - 15 reps - 2 hold  Isometric Shoulder Flexion at Wall - 2 x daily - 20 reps - 5 hold  Isometric Shoulder Abduction at Wall - 2 x daily - 20 reps - 5 hold  Isometric Shoulder Extension at Wall - 2 x daily - 20 reps - 5 hold

## 2023-03-13 ENCOUNTER — OFFICE VISIT (OUTPATIENT)
Age: 58
End: 2023-03-13
Payer: MEDICARE

## 2023-03-13 DIAGNOSIS — M19.012 OSTEOARTHRITIS OF GLENOHUMERAL JOINT, LEFT: ICD-10-CM

## 2023-03-13 DIAGNOSIS — Z78.9 IMPAIRED MOTOR CONTROL: ICD-10-CM

## 2023-03-13 DIAGNOSIS — G89.29 CHRONIC LEFT SHOULDER PAIN: Primary | ICD-10-CM

## 2023-03-13 DIAGNOSIS — Z74.09 IMPAIRED MOBILITY AND ADLS: ICD-10-CM

## 2023-03-13 DIAGNOSIS — Z78.9 IMPAIRED MOBILITY AND ADLS: ICD-10-CM

## 2023-03-13 DIAGNOSIS — M25.512 CHRONIC LEFT SHOULDER PAIN: Primary | ICD-10-CM

## 2023-03-13 DIAGNOSIS — Z74.09 DECREASED FUNCTIONAL MOBILITY AND ENDURANCE: ICD-10-CM

## 2023-03-13 DIAGNOSIS — M25.612 DECREASED RANGE OF MOTION OF LEFT SHOULDER: ICD-10-CM

## 2023-03-13 PROCEDURE — 97140 MANUAL THERAPY 1/> REGIONS: CPT

## 2023-03-13 PROCEDURE — 97110 THERAPEUTIC EXERCISES: CPT

## 2023-03-13 NOTE — PROGRESS NOTES
[address]  [address] [facility]  [address] [address]  Dept: [phone]      Physical Therapy Daily Note     Referring MD: NICK Menard - *  Diagnosis:     ICD-10-CM    1. Chronic left shoulder pain  M25.512     G89.29       2. Osteoarthritis of glenohumeral joint, left  M19.012       3. Impaired mobility and ADLs  Z74.09     Z78.9       4. Decreased functional mobility and endurance  Z74.09       5. Impaired motor control  Z78.9       6. Decreased range of motion of left shoulder  M25.612          Therapy precautions:None  Co-morbidities affecting plan of care: hx of pituitary adenoma, RTC repair (L, 2017)    PERTINENT MEDICAL HISTORY     Past medical and surgical history:   Past Medical History:   Diagnosis Date    Gastroesophageal reflux disease     Hiatal hernia     Hypertension     Irritable bowel syndrome     Obstructive sleep apnea     Osteopenia     Pituitary adenoma ([facility] [address]     Vitamin D deficiency       Past Surgical History:   Procedure Laterality Date    BREAST BIOPSY Right 2012    benign    HYSTERECTOMY (CERVIX STATUS UNKNOWN)  1997    ovaries intact    LAPAROSCOPY      ROTATOR CUFF REPAIR Left 2017    Ethan Felix [facility]    TUMOR REMOVAL  2008    [facility] (Dr. Bridget Gold)    UROLOGICAL SURGERY      cystoscopy     Medications: reviewed in chart   Allergies: Allergies   Allergen Reactions    Aspirin      Other reaction(s): Wheezing and/or shortness of breathe-Allergy      Chief complaints/history of injury:   How did symptoms start: patient reports no specific injury, only progressive onset of soreness and difficulty in motion    PN (3/6/23): Nature of condition: Chronic (continuous duration > 3 months)  Describe current symptoms: mild soreness on top of the shoulder with OH and motions    Pain Assessment:  Pain location: superior aspect of GHJ    Average Pain/symptom intensity (0-10 scale)  Last 24 hours: 1/10  Last week (1-7 days): 1/10  How often do you feel symptoms? Intermittently (0-25%)    How much have your symptoms interfered with daily activities? Not at all  How has your condition changed since receiving care at this facility? Much better  In general, would you say your current overall health is fair     Functional Outcome Measures: QuickDASH:  29.5/100= 29.5% functional deficit    Patient Stated Goals: improve function    Initial Evaluation: 1/18/23  Last Progress Note: n/a  Total Visits: 12/17    Total Timed Codes: 40 min, Total Treatment Time: 40 min    SUBJECTIVE     Pt reports continued improvement in symptoms and daily function. She is a little frustrated on arrival, as she is unable to raise her arm as much as she could yesterday. OBJECTIVE     Hand/Side Dominance: left handed  Observation:   Posture: Forward head, Rounded shoulders, and Scapular position: abducted and upwardly rotated  Swelling/Edema: none  Palpation/joint mobility: L GHJ: mild restriction (posterior, inferior),  hypertonic subscapularis (L, mod with isolated trigger points)    A/PROM Measures:  Restrictions: Flexion (R min, L mod) abduction (R min, L mod) with mod L scapular elevation    Treatment provided today:  Manual therapy (07453) x 15 min utilizing techniques to improve joint and/or soft tissue mobility, ROM, and function as well as helping to decrease pain/spasms and swelling.   Palpation and assessment of soft tissue, muscles, and landmarks   GHJ mobilization  Posterior (L) grade 2-4  STM (passive release)  subscapularis    Therapeutic exercise (22450) x 25 min to address ROM/strength deficits  UBE (L2.5, fwd/back, 4/4) 8min  PROM  MWM  Scapular block  Abduction  Scaption  flexion  Posterior glide  Flexion  Scaption  Scapular sequence  [place] at head, 2min each  Row  High (med)  Mid (med) 2min  Horizontal abduction (light)  Extension (med, supinated)  [place] at knees  Scaption (v light band) 1x15  ER0 abduction to 90 flexion (v light band) 60s each  ASSESSMENT     Patient demonstrates:  Deficits in GHJ mobility and associated muscular tone as detailed above     She has improvements in:  AROM s/p manual with increased abduction and reduction in elevation compensation during flexion  Load tolerance, progressing scapular sequencing with increased duration, along with progression of anterior loading     Continues with:  Deficits in ROM, strength, and function of the UQ  Presentation is consistent with osteoarthritis with continued clearance of labral pathology    PLAN     Continue therapy according to POC. Manual treatment for improved mobility and symptom modulation. Progress scapular stabilization with increased loading and dynamic motion. Advancement functional tasks as appropriate. PLAN OF CARE     Effective Dates: 1/18/2023 TO 4/18/2023 (90 days). Frequency/Duration: 1x/week for 60 Day(s)  Interventions may include but are not limited to: (29500) Therapeutic exercise to develop ROM, strength, endurance and flexibility  (27431) Therapeutic activities using dynamic activities to improve function  (01006) Manual therapy techniques to improve joint and/or soft tissue mobility, ROM, and function as well as helping to decrease pain/spasms and swelling  (23967/23983) Dry needling for the management of neuromusculoskeletal pain and movement impairment  Home exercise program (HEP) development  Modalities prn to address pain, spasms, and swelling: (39284) Electrical stimulation - attended    GOALS     Short term goals to be met by 2/15/2023  (4 weeks):  Pt will demonstrate good recall of HEP requiring minimal verbal cuing for proper form and technique. Goal Met 2/23/2023  Pt will wake </= 2 times/night to improve rest required for ADLs. Goal Met 2/23/2023  Pt. will be minimally tender with palpation to improve ability to lie/sleep on side. Goal Met 2/23/2023  Pt to subjectively report </= 4/10 pain to allow for increased participation in functional movements.  Goal Met 3/6/2023  Demonstrate AROM of involved shoulder that is within 5-10 degrees of the uninjured UE and does not limit function. Goal Met 3/6/2023    Long term goals to be met by 3/15/2023  (8 weeks):  Pt to subjectively report </= 2/10 pain with shoulder movements to allow for return to PLOF. Goal Met 3/6/2023  MMT involved Shoulder to >/= 4+/5 allowing for normal lifting, reaching and pushing/pulling associated with ADLs. Goal Not Met 3/6/2023 - Continue  Pt will report return to previous level of function without c/o pain. Goal Not Met 3/6/2023 - Continue  Improve QuickDASH Score to </=  20% impairment, demonstrating improved overall function. Goal Not Met 3/6/2023 - Continue    [employer]  Access Code: [email or web address]  URL: [email or web address]  Date: 02/06/2023  Prepared by: Humble Kearney    Exercises  Seated Scapular Protraction and Retraction - 2 x daily - 2 sets - 20 reps - 2 hold  Supine Alternating Shoulder Flexion - 2 x daily - 2 sets - 20 reps - 2 hold  Sidelying Shoulder Abduction Palm Forward - 2 x daily - 2 sets - 20 reps  Standing 'L' Stretch at Counter - 2 x daily - 2 sets - 15 reps - 2 hold  Isometric Shoulder Flexion at Wall - 2 x daily - 20 reps - 5 hold  Isometric Shoulder Abduction at Wall - 2 x daily - 20 reps - 5 hold  Isometric Shoulder Extension at Wall - 2 x daily - 20 reps - 5 hold

## 2023-03-20 ENCOUNTER — OFFICE VISIT (OUTPATIENT)
Age: 58
End: 2023-03-20
Payer: MEDICARE

## 2023-03-20 DIAGNOSIS — M25.612 DECREASED RANGE OF MOTION OF LEFT SHOULDER: ICD-10-CM

## 2023-03-20 DIAGNOSIS — Z78.9 IMPAIRED MOBILITY AND ADLS: ICD-10-CM

## 2023-03-20 DIAGNOSIS — M25.512 CHRONIC LEFT SHOULDER PAIN: Primary | ICD-10-CM

## 2023-03-20 DIAGNOSIS — Z74.09 IMPAIRED MOBILITY AND ADLS: ICD-10-CM

## 2023-03-20 DIAGNOSIS — Z74.09 DECREASED FUNCTIONAL MOBILITY AND ENDURANCE: ICD-10-CM

## 2023-03-20 DIAGNOSIS — Z78.9 IMPAIRED MOTOR CONTROL: ICD-10-CM

## 2023-03-20 DIAGNOSIS — G89.29 CHRONIC LEFT SHOULDER PAIN: Primary | ICD-10-CM

## 2023-03-20 DIAGNOSIS — M19.012 OSTEOARTHRITIS OF GLENOHUMERAL JOINT, LEFT: ICD-10-CM

## 2023-03-20 PROCEDURE — 97140 MANUAL THERAPY 1/> REGIONS: CPT

## 2023-03-20 PROCEDURE — 97110 THERAPEUTIC EXERCISES: CPT

## 2023-03-20 NOTE — PROGRESS NOTES
is within 5-10 degrees of the uninjured UE and does not limit function. Goal Met 3/6/2023    Long term goals to be met by 3/15/2023  (8 weeks):  Pt to subjectively report </= 2/10 pain with shoulder movements to allow for return to PLOF. Goal Met 3/6/2023  MMT involved Shoulder to >/= 4+/5 allowing for normal lifting, reaching and pushing/pulling associated with ADLs. Goal Not Met 3/6/2023 - Continue  Pt will report return to previous level of function without c/o pain. Goal Not Met 3/6/2023 - Continue  Improve QuickDASH Score to </=  20% impairment, demonstrating improved overall function. Goal Not Met 3/6/2023 - Continue    [employer]  Access Code: [email or web address]  URL: [email or web address]  Date: 02/06/2023  Prepared by: Niecy Sanabria    Exercises  Seated Scapular Protraction and Retraction - 2 x daily - 2 sets - 20 reps - 2 hold  Supine Alternating Shoulder Flexion - 2 x daily - 2 sets - 20 reps - 2 hold  Sidelying Shoulder Abduction Palm Forward - 2 x daily - 2 sets - 20 reps  Standing 'L' Stretch at Counter - 2 x daily - 2 sets - 15 reps - 2 hold  Isometric Shoulder Flexion at Wall - 2 x daily - 20 reps - 5 hold  Isometric Shoulder Abduction at Wall - 2 x daily - 20 reps - 5 hold  Isometric Shoulder Extension at Wall - 2 x daily - 20 reps - 5 hold

## 2023-04-03 ENCOUNTER — OFFICE VISIT (OUTPATIENT)
Age: 58
End: 2023-04-03
Payer: MEDICARE

## 2023-04-03 DIAGNOSIS — Z74.09 IMPAIRED MOBILITY AND ADLS: ICD-10-CM

## 2023-04-03 DIAGNOSIS — M25.512 CHRONIC LEFT SHOULDER PAIN: Primary | ICD-10-CM

## 2023-04-03 DIAGNOSIS — M19.012 OSTEOARTHRITIS OF GLENOHUMERAL JOINT, LEFT: ICD-10-CM

## 2023-04-03 DIAGNOSIS — Z78.9 IMPAIRED MOBILITY AND ADLS: ICD-10-CM

## 2023-04-03 DIAGNOSIS — M25.612 DECREASED RANGE OF MOTION OF LEFT SHOULDER: ICD-10-CM

## 2023-04-03 DIAGNOSIS — Z74.09 DECREASED FUNCTIONAL MOBILITY AND ENDURANCE: ICD-10-CM

## 2023-04-03 DIAGNOSIS — G89.29 CHRONIC LEFT SHOULDER PAIN: Primary | ICD-10-CM

## 2023-04-03 DIAGNOSIS — R29.898 SHOULDER WEAKNESS: ICD-10-CM

## 2023-04-03 DIAGNOSIS — Z78.9 IMPAIRED MOTOR CONTROL: ICD-10-CM

## 2023-04-03 PROCEDURE — 97110 THERAPEUTIC EXERCISES: CPT

## 2023-04-03 NOTE — PROGRESS NOTES
per QuickDASH. Objective findings revealed full AROM in flexion and abduction, with mild deficit in scaption, along with appropriate strength globally. Pt has  achieved relevant therapeutic goals and is appropriate for self management with continued HEP . PLAN     Discharge with continued HEP. GOALS     Short term goals to be met by 2/15/2023  (4 weeks):  Pt will demonstrate good recall of HEP requiring minimal verbal cuing for proper form and technique. Goal Met 2/23/2023  Pt will wake </= 2 times/night to improve rest required for ADLs. Goal Met 2/23/2023  Pt. will be minimally tender with palpation to improve ability to lie/sleep on side. Goal Met 2/23/2023  Pt to subjectively report </= 4/10 pain to allow for increased participation in functional movements. Goal Met 3/6/2023  Demonstrate AROM of involved shoulder that is within 5-10 degrees of the uninjured UE and does not limit function. Goal Met 3/6/2023    Long term goals to be met by 3/15/2023  (8 weeks):  Pt to subjectively report </= 2/10 pain with shoulder movements to allow for return to PLOF. Goal Met 3/6/2023  MMT involved Shoulder to >/= 4+/5 allowing for normal lifting, reaching and pushing/pulling associated with ADLs. Goal Met 4/3/2023  Pt will report return to previous level of function without c/o pain. Goal Met 4/3/2023  Improve QuickDASH Score to </=  20% impairment, demonstrating improved overall function. Goal Not Met 3/6/2023 - Continue    [employer]  Access Code: [email or web address]  URL: [email or web address]

## 2023-09-06 ENCOUNTER — TELEMEDICINE (OUTPATIENT)
Dept: ENDOCRINOLOGY | Age: 58
End: 2023-09-06

## 2023-09-06 DIAGNOSIS — D35.2 PITUITARY ADENOMA (HCC): Primary | ICD-10-CM

## 2023-09-06 RX ORDER — DULOXETIN HYDROCHLORIDE 20 MG/1
CAPSULE, DELAYED RELEASE ORAL
COMMUNITY
Start: 2023-06-20

## 2023-09-06 NOTE — PROGRESS NOTES
(117.9 kg)       Physical Exam    No orders of the defined types were placed in this encounter. Current Outpatient Medications   Medication Sig Dispense Refill    chlorthalidone (HYGROTON) 25 MG tablet       metoprolol tartrate (LOPRESSOR) 25 MG tablet       pravastatin (PRAVACHOL) 40 MG tablet       omeprazole (PRILOSEC) 20 MG delayed release capsule       fluticasone (FLONASE) 50 MCG/ACT nasal spray       fexofenadine (ALLEGRA) 180 MG tablet Strength: 180 mg; Form: tablet; SIG: take 1 tab(s) orally once a day      SYMBICORT 80-4.5 MCG/ACT AERO       methylPREDNISolone (MEDROL, JOSELINE,) 4 MG tablet Take as directed on package 1 kit 0     No current facility-administered medications for this visit. Purnima Marin MD, FACE      Portions of this note were generated with the assistance of voice recognition software. As such, some errors in transcription may be present.

## 2023-09-18 ENCOUNTER — OFFICE VISIT (OUTPATIENT)
Dept: ENDOCRINOLOGY | Age: 58
End: 2023-09-18
Payer: MEDICARE

## 2023-09-18 VITALS
SYSTOLIC BLOOD PRESSURE: 115 MMHG | DIASTOLIC BLOOD PRESSURE: 80 MMHG | WEIGHT: 253 LBS | OXYGEN SATURATION: 98 % | HEART RATE: 71 BPM | BODY MASS INDEX: 42.1 KG/M2

## 2023-09-18 DIAGNOSIS — D35.2 PITUITARY ADENOMA (HCC): Primary | ICD-10-CM

## 2023-09-18 PROCEDURE — 99213 OFFICE O/P EST LOW 20 MIN: CPT | Performed by: INTERNAL MEDICINE

## 2023-09-18 PROCEDURE — G8427 DOCREV CUR MEDS BY ELIG CLIN: HCPCS | Performed by: INTERNAL MEDICINE

## 2023-09-18 PROCEDURE — 1036F TOBACCO NON-USER: CPT | Performed by: INTERNAL MEDICINE

## 2023-09-18 PROCEDURE — 3017F COLORECTAL CA SCREEN DOC REV: CPT | Performed by: INTERNAL MEDICINE

## 2023-09-18 PROCEDURE — G8417 CALC BMI ABV UP PARAM F/U: HCPCS | Performed by: INTERNAL MEDICINE

## 2023-09-18 ASSESSMENT — ENCOUNTER SYMPTOMS
DIARRHEA: 1
CONSTIPATION: 0

## 2023-09-18 NOTE — PROGRESS NOTES
Roberta Muñoz MD, [facility]            Reason for visit: Follow-up of a pituitary adenoma        ASSESSMENT AND PLAN:    1. Pituitary adenoma ([address])  Imaging has been stable. Most recent MRI was 2017. I will arrange repeat MRI at her convenience. Imaging every 5 years is appropriate. She still does yearly visual fields. Prior pituitary hormone testing was normal.  If her next MRI is stable, I will bring her back in 5 years with plans to repeat MRI thereafter.  - MRI PITUITARY W WO CONTRAST; Future      Follow-up and Dispositions    Return in about 5 years (around 2028). History of Present Illness:    PITUITARY DISEASE  Amber Garcia is here for follow-up of a pituitary tumor. This was discovered in . She was previously evaluated/managed by Dr. Jarrod Meadows at [facility]. I last saw her in 2021. She has canceled or no-showed multiple appointments since then. Symptoms: See review of review of systems below     Imagin: MRI ([facility])- 14 x 17 mm pituitary tumor. 3/2008:  MRI ([facility])- residual 12 mm pituitary tumor. 2012:  MRI ([facility])- 8.4 x 9.4 mm pituitary tumor. 2015: MRI ([facility])- Mild enlargement of the pituitary (vertical dimension 11.2 mm). Deviation of the pituitary stalk to the left. 6.6 x 8.6 x 10.5 mm zone of poor enhancement in the pituitary. Findings stable compared to 2012.     2017: MRI ([facility])- 10 x 8 x 8 mm pituitary tumor. Deviation of the pituitary stalk to the left. Normal optic chiasm. Laboratory evaluation:  2015: Prolactin 6.9, FSH 35.3, random cortisol 11.4, TSH 1.310, free T4 1.0.  2016: FSH 38.5, random cortisol 8.9, TSH 1.19, free T4 1. 13.  8/3/2017:  FSH 33.3, TSH 1.190, free T4 1.2, BMP normal.     Visual fields:  She previously did yearly visual fields (most recently 2020), all of which have been normal since surgery.   She sees [employer]

## 2023-09-30 ENCOUNTER — HOSPITAL ENCOUNTER (OUTPATIENT)
Dept: MRI IMAGING | Age: 58
End: 2023-09-30
Attending: INTERNAL MEDICINE
Payer: MEDICARE

## 2023-09-30 DIAGNOSIS — D35.2 PITUITARY ADENOMA (HCC): ICD-10-CM

## 2023-09-30 PROCEDURE — 70553 MRI BRAIN STEM W/O & W/DYE: CPT | Performed by: INTERNAL MEDICINE

## 2023-09-30 PROCEDURE — 6360000004 HC RX CONTRAST MEDICATION: Performed by: INTERNAL MEDICINE

## 2023-09-30 PROCEDURE — A9579 GAD-BASE MR CONTRAST NOS,1ML: HCPCS | Performed by: INTERNAL MEDICINE

## 2023-09-30 RX ORDER — SODIUM CHLORIDE 0.9 % (FLUSH) 0.9 %
30 SYRINGE (ML) INJECTION AS NEEDED
Status: DISCONTINUED | OUTPATIENT
Start: 2023-09-30 | End: 2023-10-04 | Stop reason: HOSPADM

## 2023-09-30 RX ADMIN — GADOTERIDOL 23 ML: 279.3 INJECTION, SOLUTION INTRAVENOUS at 10:39

## (undated) DEVICE — ULTRATAPE 2MM BLUE 38 PKG OF 6

## (undated) DEVICE — MASTISOL ADHESIVE LIQ 2/3ML

## (undated) DEVICE — SYR 50ML LR LCK 1ML GRAD NSAF --

## (undated) DEVICE — TWINFIX 2 INCH SUTURE PASSER, STERILE: Brand: TWINFIX

## (undated) DEVICE — KENDALL SCD EXPRESS SLEEVES, KNEE LENGTH, MEDIUM: Brand: KENDALL SCD

## (undated) DEVICE — 90-S ACCELERATOR, SUCTION PROBE, NON-BENDABLE, MAX CUT LEVEL 11: Brand: SERFAS ENERGY

## (undated) DEVICE — OUTFLOW CASSETTE TUBING, DO NOT USE IF PACKAGE IS DAMAGED: Brand: CROSSFLOW

## (undated) DEVICE — STERILE POLYISOPRENE POWDER-FREE SURGICAL GLOVES: Brand: PROTEXIS

## (undated) DEVICE — 3M™ IOBAN™ 2 ANTIMICROBIAL INCISE DRAPE 6650EZ: Brand: IOBAN™ 2

## (undated) DEVICE — PACK,SHOULDER,DRAPE,POUCH: Brand: MEDLINE

## (undated) DEVICE — SURGICAL PROCEDURE PACK BASIC ST FRANCIS

## (undated) DEVICE — 4-PORT MANIFOLD: Brand: NEPTUNE 2

## (undated) DEVICE — (D)PREP SKN CHLRAPRP APPL 26ML -- CONVERT TO ITEM 371833

## (undated) DEVICE — ULTRATAPE SUTURE COBRAID BLUE, 6                                    PER BOX: Brand: ULTRATAPE

## (undated) DEVICE — STERILE LATEX POWDER FREE SURGICAL GLOVES WITH HYDROGEL COATING: Brand: PROTEXIS

## (undated) DEVICE — (D)STRIP SKN CLSR 0.5X4IN WHT --

## (undated) DEVICE — Device

## (undated) DEVICE — INTENDED FOR TISSUE SEPARATION, AND OTHER PROCEDURES THAT REQUIRE A SHARP SURGICAL BLADE TO PUNCTURE OR CUT.: Brand: BARD-PARKER ® STAINLESS STEEL BLADES

## (undated) DEVICE — CLEAR-TRAC 7.0 MM X 72 MM THREADED                                    CANNULA, WITH DISPOSABLE OBTURATOR,                                    GREY, STERILE: Brand: CLEAR-TRAC

## (undated) DEVICE — INFLOW CASSETTE TUBING, DO NOT USE IF PACKAGE IS DAMAGED: Brand: CROSSFLOW

## (undated) DEVICE — BUR SHV CUT HLLW 6 FLUT 5.5MM --

## (undated) DEVICE — NDL SPNE QNCKE 18GX3.5IN LF --

## (undated) DEVICE — SOFT SILICONE HYDROCELLULAR FOAM DRESSING WITH LOCK AWAY LAYER: Brand: ALLEVYN LIFE XL 21X21 CTN10

## (undated) DEVICE — TRUEPASS DISPOSABLE NEEDLES 5 PER BOX: Brand: TRUEPASS

## (undated) DEVICE — BLADE SHV CUT MENIS AGG + 4MM --

## (undated) DEVICE — [AGGRESSIVE 6-FLUTE BARREL BUR, ARTHROSCOPIC SHAVER BLADE,  DO NOT RESTERILIZE,  DO NOT USE IF PACKAGE IS DAMAGED,  KEEP DRY,  KEEP AWAY FROM SUNLIGHT]: Brand: FORMULA

## (undated) DEVICE — SUTURE MCRYL SZ 3-0 L27IN ABSRB UD L19MM PS-2 3/8 CIR PRIM Y427H

## (undated) DEVICE — SOLUTION IRRIG 3000ML 0.9% SOD CHL FLX CONT 0797208] ICU MEDICAL INC]

## (undated) DEVICE — CLEAR-TRAC 5.5 MM X 72 MM THREADED                                    CANNULA, WITH DISPOSABLE OBTURATOR,                                    BLUE, STERILE: Brand: CLEAR-TRAC